# Patient Record
Sex: MALE | Race: WHITE | Employment: OTHER | URBAN - METROPOLITAN AREA
[De-identification: names, ages, dates, MRNs, and addresses within clinical notes are randomized per-mention and may not be internally consistent; named-entity substitution may affect disease eponyms.]

---

## 2017-01-03 PROBLEM — E78.5 DYSLIPIDEMIA: Status: ACTIVE | Noted: 2017-01-03

## 2017-01-03 PROBLEM — I10 ESSENTIAL HYPERTENSION: Status: ACTIVE | Noted: 2017-01-03

## 2017-06-08 ENCOUNTER — HOSPITAL ENCOUNTER (OUTPATIENT)
Dept: LAB | Age: 67
Discharge: HOME | End: 2017-06-08
Attending: ORTHOPAEDIC SURGERY
Payer: COMMERCIAL

## 2017-06-08 LAB
ALBUMIN SERPL BCP-MCNC: 3.6 G/DL (ref 3.2–4.6)
ALBUMIN/GLOB SERPL: 1 {RATIO} (ref 1.2–3.5)
ALP SERPL-CCNC: 59 U/L (ref 50–136)
ALT SERPL-CCNC: 24 U/L (ref 12–65)
ANION GAP BLD CALC-SCNC: 11 MMOL/L (ref 7–16)
AST SERPL W P-5'-P-CCNC: 17 U/L (ref 15–37)
BASOPHILS # BLD AUTO: 0 K/UL (ref 0–0.2)
BASOPHILS # BLD: 0 % (ref 0–2)
BILIRUB SERPL-MCNC: 0.3 MG/DL (ref 0.2–1.1)
BUN SERPL-MCNC: 18 MG/DL (ref 8–23)
CALCIUM SERPL-MCNC: 8.8 MG/DL (ref 8.3–10.4)
CHLORIDE SERPL-SCNC: 105 MMOL/L (ref 98–107)
CO2 SERPL-SCNC: 27 MMOL/L (ref 21–32)
CREAT SERPL-MCNC: 1.1 MG/DL (ref 0.8–1.5)
CRP SERPL-MCNC: <0.2 MG/DL (ref 0–0.9)
DIFFERENTIAL METHOD BLD: ABNORMAL
EOSINOPHIL # BLD: 0.2 K/UL (ref 0–0.8)
EOSINOPHIL NFR BLD: 2 % (ref 0.5–7.8)
ERYTHROCYTE [DISTWIDTH] IN BLOOD BY AUTOMATED COUNT: 14.4 % (ref 11.9–14.6)
ERYTHROCYTE [SEDIMENTATION RATE] IN BLOOD: 9 MM/HR (ref 0–20)
GLOBULIN SER CALC-MCNC: 3.5 G/DL (ref 2.3–3.5)
GLUCOSE SERPL-MCNC: 96 MG/DL (ref 65–100)
HCT VFR BLD AUTO: 41.4 % (ref 41.1–50.3)
HGB BLD-MCNC: 13.5 G/DL (ref 13.6–17.2)
IMM GRANULOCYTES # BLD: 0 K/UL (ref 0–0.5)
IMM GRANULOCYTES NFR BLD AUTO: 0.1 % (ref 0–5)
LYMPHOCYTES # BLD AUTO: 27 % (ref 13–44)
LYMPHOCYTES # BLD: 2.2 K/UL (ref 0.5–4.6)
MCH RBC QN AUTO: 28.2 PG (ref 26.1–32.9)
MCHC RBC AUTO-ENTMCNC: 32.6 G/DL (ref 31.4–35)
MCV RBC AUTO: 86.6 FL (ref 79.6–97.8)
MONOCYTES # BLD: 0.5 K/UL (ref 0.1–1.3)
MONOCYTES NFR BLD AUTO: 6 % (ref 4–12)
NEUTS SEG # BLD: 5.4 K/UL (ref 1.7–8.2)
NEUTS SEG NFR BLD AUTO: 65 % (ref 43–78)
PLATELET # BLD AUTO: 297 K/UL (ref 150–450)
PMV BLD AUTO: 10.5 FL (ref 10.8–14.1)
POTASSIUM SERPL-SCNC: 4.2 MMOL/L (ref 3.5–5.1)
PROT SERPL-MCNC: 7.1 G/DL (ref 6.3–8.2)
RBC # BLD AUTO: 4.78 M/UL (ref 4.23–5.67)
SODIUM SERPL-SCNC: 143 MMOL/L (ref 136–145)
URATE SERPL-MCNC: 4.6 MG/DL (ref 2.6–6)
WBC # BLD AUTO: 8.3 K/UL (ref 4.3–11.1)

## 2017-06-08 PROCEDURE — 80053 COMPREHEN METABOLIC PANEL: CPT | Performed by: ORTHOPAEDIC SURGERY

## 2017-06-08 PROCEDURE — 84550 ASSAY OF BLOOD/URIC ACID: CPT | Performed by: ORTHOPAEDIC SURGERY

## 2017-06-08 PROCEDURE — 86430 RHEUMATOID FACTOR TEST QUAL: CPT | Performed by: ORTHOPAEDIC SURGERY

## 2017-06-08 PROCEDURE — 81374 HLA I TYPING 1 ANTIGEN LR: CPT | Performed by: ORTHOPAEDIC SURGERY

## 2017-06-08 PROCEDURE — 85025 COMPLETE CBC W/AUTO DIFF WBC: CPT | Performed by: ORTHOPAEDIC SURGERY

## 2017-06-08 PROCEDURE — 86038 ANTINUCLEAR ANTIBODIES: CPT | Performed by: ORTHOPAEDIC SURGERY

## 2017-06-08 PROCEDURE — 86140 C-REACTIVE PROTEIN: CPT | Performed by: ORTHOPAEDIC SURGERY

## 2017-06-08 PROCEDURE — 85652 RBC SED RATE AUTOMATED: CPT | Performed by: ORTHOPAEDIC SURGERY

## 2017-06-08 PROCEDURE — 36415 COLL VENOUS BLD VENIPUNCTURE: CPT | Performed by: ORTHOPAEDIC SURGERY

## 2017-06-09 LAB
ANA SER QL: NEGATIVE
RHEUMATOID FACT SER QL LA: NEGATIVE

## 2017-06-13 LAB — HLA-B27 QL NAA+PROBE: NEGATIVE

## 2018-08-22 ENCOUNTER — HOSPITAL ENCOUNTER (OUTPATIENT)
Dept: LAB | Age: 68
Discharge: HOME | End: 2018-08-22

## 2018-08-22 PROCEDURE — 88305 TISSUE EXAM BY PATHOLOGIST: CPT

## 2018-11-01 ENCOUNTER — HOSPITAL ENCOUNTER (OUTPATIENT)
Dept: GENERAL RADIOLOGY | Age: 68
Discharge: HOME | End: 2018-11-01
Payer: COMMERCIAL

## 2018-11-01 DIAGNOSIS — M54.2 CERVICAL PAIN: ICD-10-CM

## 2018-11-01 PROCEDURE — 72050 X-RAY EXAM NECK SPINE 4/5VWS: CPT

## 2018-11-14 ENCOUNTER — HOSPITAL ENCOUNTER (OUTPATIENT)
Dept: GENERAL RADIOLOGY | Age: 68
Discharge: HOME | End: 2018-11-14
Payer: COMMERCIAL

## 2018-11-14 DIAGNOSIS — R52 PAIN: ICD-10-CM

## 2018-11-14 PROCEDURE — 73560 X-RAY EXAM OF KNEE 1 OR 2: CPT

## 2019-04-24 ENCOUNTER — HOSPITAL ENCOUNTER (OUTPATIENT)
Dept: GENERAL RADIOLOGY | Age: 69
Discharge: HOME | End: 2019-04-24
Payer: COMMERCIAL

## 2019-04-24 DIAGNOSIS — M54.50 LOW BACK PAIN: ICD-10-CM

## 2019-04-24 DIAGNOSIS — M54.6 THORACIC BACK PAIN: ICD-10-CM

## 2019-04-24 PROCEDURE — 72070 X-RAY EXAM THORAC SPINE 2VWS: CPT

## 2019-04-24 PROCEDURE — 72110 X-RAY EXAM L-2 SPINE 4/>VWS: CPT

## 2020-08-24 ENCOUNTER — HOSPITAL ENCOUNTER (OUTPATIENT)
Dept: PHYSICAL THERAPY | Age: 70
Discharge: HOME | End: 2020-08-24
Payer: COMMERCIAL

## 2020-08-24 PROCEDURE — 97162 PT EVAL MOD COMPLEX 30 MIN: CPT

## 2020-08-24 PROCEDURE — 97112 NEUROMUSCULAR REEDUCATION: CPT

## 2020-08-24 NOTE — THERAPY EVALUATION
Maurizio Agustin. : 1950  [facility]  Secondary: [address] [facility],Third Floor at [facility]  [address] [facility], [address], [address]  [phone]   [phone]        [address] Assessment 2020   ICD-10: Treatment Diagnosis: Difficulty in walking, not elsewhere classified (R26.2)  Precautions/Allergies:   Aspirin and Sulfa (sulfonamide antibiotics)   TREATMENT PLAN:  Effective Dates: 2020 TO 2020 (90 days). Frequency/Duration: 1-2 times a week for 90 Day(s) MEDICAL/REFERRING DIAGNOSIS:  balance   DATE OF ONSET: 2020  REFERRING PHYSICIAN: Benny Crowell MD MD Orders: Evaluate and treat   Return MD Appointment: unknown      INITIAL ASSESSMENT:  Mr. Dali Walker presents with imbalance, dizziness, and difficulty walking. Patient is at higher fall risk based on history of multiple falls and score received on Dynamic Gait Index. Patient would benefit from skilled physical therapy to address problems and goals. Thank you for this referral.      PROBLEM LIST (Impacting functional limitations):  1. Decreased Ambulation Ability/Technique  2. Decreased Balance  3. Decreased [place] with Home Exercise Program  4. Increased dizziness INTERVENTIONS PLANNED: (Treatment may consist of any combination of the following)  1. Balance Exercise  2. Gait Training  3. Home Exercise Program (HEP)  4. Habituation exercises     GOALS: (Goals have been discussed and agreed upon with patient.)  Short-Term Functional Goals: Time Frame: 30 days   1. Patient will be independent with home exercise program to improve balance and decrease risk of falls with daily activities. Discharge Goals: Time Frame: 90 days   1. Patient will score greater than or equal to 23/24 on Dynamic Gait Index indicating improved balance and decreased fall risk with daily activities.    2. Patient will score above average two out of three times on condition 6 of SMART Equitest Sensory Organization test indicating improved balance. 3. Patient will report improved stability and decreased falls with daily activities. OUTCOME MEASURE:   Tool Used: Dynamic Gait Index  Score:  Initial: 19/24 Most Recent: X/24 (Date: -- )   Interpretation of Score: Each section is scored on a 0-3 scale, 0 representing the patients inability to perform the task and 3 representing independence. The scores of each section are added together for a total score of 24. Any score below 19 indicates increased risk for falls. MEDICAL NECESSITY:   · Patient is expected to demonstrate progress in balance to improve safety during activities of daily living. REASON FOR SERVICES/OTHER COMMENTS:  · Patient continues to require skilled intervention due to higher fall risk with daily activities. Total Duration:  PT Patient Time In/Time Out  Time In: 0845  Time Out: 0930    Rehabilitation Potential For Stated Goals: Excellent  Regarding Flora Miller Jr.'s therapy, I certify that the treatment plan above will be carried out by a therapist or under their direction. Thank you for this referral,  Faith Adkins, PT     Referring Physician Signature: Clare Nicole MD _______________________________ Date _____________     PAIN/SUBJECTIVE:   Initial: Pain Intensity 1: 0  Post Session:  0/10   HISTORY:   History of Injury/Illness (Reason for Referral):  Patient complaints of dizziness, imbalance, and difficulty walking. Patient reports symptoms have gotten worse since March 2020. Patient has had ten falls over the past couple of months. Patient tends to lose his balance backwards. Patient reports dizziness as 3/10 and pain level as 0/10. Patient describes dizziness as lightheadedness with quick movements. Patient uses no assistive device during ambulation.     Past Medical History/Comorbidities:   Mr. India Parada  has a past medical history of Cancer ([address] Hypercholesteremia, Hypertension, Inguinal hernia (9/17/14), Personal history of prostate cancer, and Psychiatric disorder. Mr. Jose Ramon Art  has a past surgical history that includes pr appendectomy; biopsy prostate; hx hernia repair (Left); and hx prostatectomy. Social History/Living Environment:     Lives with spouse in split level home. Prior Level of Function/Work/Activity:  Independent. Retired. Dominant Side:         RIGHT  Personal Factors:          Sex:  male        Age:  72 y.o. Ambulatory/Rehab Services H2 Model Falls Risk Assessment   Risk Factors:       (1)  Dizziness/Vertigo       (1)  Gender [Male]       (5)  History of Recent Falls [w/in 3 months] Ability to Rise from Chair:       (0)  Ability to rise in a single movement   Falls Prevention Plan:       Exercise/Equipment Adaptation (specify):  Patient will be supevised in the clinic at all times due to higher fall risk   Total: (5 or greater = High Risk): 7   ©2010 [facility]. [facility] Patent #[id number]. Federal Law prohibits the replication, distribution or use without written permission from [facility] [employer]   Current Medications:       Current Outpatient Medications:     omeprazole (PRILOSEC) 40 mg capsule, , Disp: , Rfl:     predniSONE (DELTASONE) 5 mg tablet, , Disp: , Rfl:     methotrexate (RHEUMATREX) 2.5 mg tablet, , Disp: , Rfl:     lamoTRIgine (LAMICTAL) 200 mg tablet, Take 400 mg by mouth nightly., Disp: , Rfl:     cholecalciferol (VITAMIN D3) 1,000 unit tablet, Take  by mouth daily. , Disp: , Rfl:     acetaminophen (TYLENOL EXTRA STRENGTH) 500 mg tablet, Take  by mouth every six (6) hours as needed for Pain., Disp: , Rfl:     Bifidobacterium Infantis (ALIGN) 4 mg cap, Take 4 mg by mouth daily. , Disp: , Rfl:     diazepam (VALIUM) 5 mg tablet, Take 5 mg by mouth every six (6) hours as needed for Anxiety.  Take 1 po tid prn, Disp: , Rfl:     QUEtiapine SR (SEROQUEL XR) 50 mg sr tablet, Take 50 mg by mouth nightly. Indications: BIPOLAR DISORDER, also insomnia, Disp: , Rfl:     PSEUDOEPHEDRINE HCL, BULK,, Take 30 mg by mouth daily as needed. , Disp: , Rfl:     amLODIPine (NORVASC) 5 mg tablet, Take 5 mg by mouth nightly. Indications: HYPERTENSION, Disp: , Rfl:     lisinopril (PRINIVIL, ZESTRIL) 20 mg tablet, Take  by mouth daily. Indications: HYPERTENSION, pm, Disp: , Rfl:     atorvastatin (LIPITOR) 20 mg tablet, Take 20 mg by mouth nightly. Indications: HYPERCHOLESTEROLEMIA, Disp: , Rfl:     cetirizine (ZYRTEC) 10 mg tablet, Take 10 mg by mouth nightly. Indications: ALLERGIC RHINITIS, Disp: , Rfl:     zolpidem (AMBIEN) 10 mg tablet, Take  by mouth nightly as needed for Sleep., Disp: , Rfl:    Date Last Reviewed:  8/24/2020   Number of Personal Factors/Comorbidities that affect the Plan of Care: 1-2: MODERATE COMPLEXITY   EXAMINATION:   Functional Mobility:         Gait/Ambulation:  Patient ambulates with a normal gait pattern except with head/body turns. Strength:          Bilateral lower extremity strength 5/5. Sensation:         Within normal limits. Postural Control & Balance:  · Feliz Balance Scale:  Not tested. · Dynamic Gait Index:  19/24.   (A score less than or equal to19/24 is abnormal and predictive of falls)     · [employer] Sensory Organization Test:  Objective findings indicate Normal use of somatosensory, Decreased use of visual, & Normal use of vestibular inputs to balance. Center of gravity is normal.  See scanned report. Patient unable to maintain his balance two out of three times on condition 6 of this test.  Patient scored 10% below norm composite score. Body Structures Involved:  1. Nerves  2. Eyes and Ears  3. Muscles Body Functions Affected:  1. Neuromusculoskeletal Activities and Participation Affected:  1. General Tasks and Demands  2. Mobility  3. Interpersonal Interactions and Relationships  4.  Community, Social and [place]   Number of elements (examined above) that affect the Plan of Care: 4+: HIGH COMPLEXITY   CLINICAL PRESENTATION:   Presentation: Evolving clinical presentation with changing clinical characteristics: MODERATE COMPLEXITY   CLINICAL DECISION MAKING:   Use of outcome tool(s) and clinical judgement create a POC that gives a: Questionable prediction of patient's progress: MODERATE COMPLEXITY

## 2020-08-24 NOTE — PROGRESS NOTES
Jennifer Rodriguez. : 1950  [facility]  [place]  at [place]  [address] [facility], [address] [place] [address] 83,[address], [address]  [phone]   [phone]     OUTPATIENT PHYSICAL THERAPY: Daily Treatment Note 2020  Visit Count:  1    ICD-10: Treatment Diagnosis: Difficulty in walking, not elsewhere classified (R26.2)  Precautions/Allergies:   Aspirin and Sulfa (sulfonamide antibiotics)   TREATMENT PLAN:  Effective Dates: 2020 TO 2020 (90 days). Frequency/Duration: 1-2 times a week for 90 Day(s)    Pre-treatment Symptoms/Complaints:  Imbalance, dizziness, and difficulty walking  Pain: Initial: Pain Intensity 1: 0  Post Session:  0/10   Medications Last Reviewed:  2020  Updated Objective Findings:  See evaluation note from today  TREATMENT:     NEUROMUSCULAR RE-EDUCATION: (10 minutes):  Exercise/activities per grid below to improve balance and coordination. Required minimal verbal cues to promote dynamic balance in standing. Date:  2020 Date:   Date:     Activity/Exercise Parameters Parameters Parameters   Walking with horizontal head turns 4 laps     Walking with vertical head turns 4 laps     Standing eyes closed Eyes closed                                  [facility] [place] Portal  Treatment/Session Summary:    · Response to Treatment:  tolerated without complaints. · Communication/Consultation:  None today  · Equipment provided today:  Home exercise program  · Recommendations/Intent for next treatment session: Next visit will focus on balance exercises, habituation exercises, and gait.     Total Treatment Billable Duration:  10 minutes + evaluation  PT Patient Time In/Time Out  Time In: 45  Time Out: Hector Means 66., PT    Future Appointments   Date Time Provider Fallon Beatty   2020 10:15 AM Ulysses Duran PT [facility] RAYRAY   9/3/2020  9:30 AM Husam Bahena PT [facility] SFE 9/8/2020 10:15 AM Vissage Villatoro Feil, PT [facility] SFE   9/10/2020 10:15 AM Vissage, Villatoro Feil, PT [facility] SFE   9/14/2020  9:30 AM Vissage, Villatoro Feil, PT [facility] S[facility]   9/17/2020 10:15 AM Vissage, Villatoro Feil, PT [facility] SFE   10/1/2020  4:15 PM [id number] BLOOD DRAW [id number] PGU   10/8/2020  4:15 PM Stewart Caballero,  [id number] PGU

## 2020-08-31 ENCOUNTER — HOSPITAL ENCOUNTER (OUTPATIENT)
Dept: PHYSICAL THERAPY | Age: 70
Discharge: HOME | End: 2020-08-31
Payer: COMMERCIAL

## 2020-08-31 PROCEDURE — 97112 NEUROMUSCULAR REEDUCATION: CPT

## 2020-08-31 NOTE — PROGRESS NOTES
Keith Handler. : 1950  [facility]  [place] [address] [place]  at [place] [id number] [facility], [address] [place] [address],[address], [facility] [address].  [phone]   [phone]     OUTPATIENT PHYSICAL THERAPY: Daily Treatment Note 2020  Visit Count:  2    ICD-10: Treatment Diagnosis: Difficulty in walking, not elsewhere classified (R26.2)  Precautions/Allergies:   Aspirin and Sulfa (sulfonamide antibiotics)   TREATMENT PLAN:  Effective Dates: 2020 TO 2020 (90 days). Frequency/Duration: 1-2 times a week for 90 Day(s)    Pre-treatment Symptoms/Complaints:  Imbalance, dizziness, and difficulty walking; no falls today  3/10 dizziness  Pain: Initial:    Post Session:  0/10   Medications Last Reviewed:  2020  Updated Objective Findings:  None Today  TREATMENT:     NEUROMUSCULAR RE-EDUCATION: (10 minutes):  Exercise/activities per grid below to improve balance and coordination. Required minimal verbal cues to promote dynamic balance in standing.        Date:  2020 Date:   Date:     Activity/Exercise Parameters Parameters Parameters   Walking with horizontal head turns 4 laps     Walking with vertical head turns 4 laps     Standing eyes closed Eyes closed                                  Balance/Vestibular Treatment:   Activity   Date  20 Date Date Date Date Date   Activity/Exercise   Sets/reps/equipment Sets/reps/  equipment Sets/reps/  equipment Sets/reps/  equipment Sets/reps/  equipment Sets/reps/  equipment   Walking with head turns     4 laps in hallway        Walking with head up & down     4 laps in hallway        Walking with diagonal head movements         Step overs     Over 1/2 foam roll x 20 reps fwd and laterallyy        Step taps     6 inch step x 30 reps fwd and cross        Marching   4 laps in hallway        Sidestepping   4 laps in hallway        Crossovers           [place]           Walking  backwards 4 laps in hallway        Tandem walking           Weaving in/out of cones             Picking up cones             Sports cord             [employer]           Kick ball           Figure 8s            Circles right/left           Walking with 360 degree turns           Spirals           Weight shifting:    Left & Right     Blue foams and rockerboard eyes open        Weight shifting: Forward & Backward      Blue foams and rockerboard eyes open        Static Standing Balance     Blue foams eyes open with head turns and up/down        Standing with feet apart             Standing with feet together             Standing with feet semitandem   Blue foams eyes open with head turns and up/down; blue foams eyes closed        Standing with feet tandem           Single leg stance           X1/X2 Viewing exercises             Hallpike-[place] testing for BPPV (Benign Paroxysmal Positional Vertigo)             Tejada-Daroff exercises           Canalith Repositioning treatment/Epley Maneuver  for BPPV (Benign Paroxysmal Positional Vertigo)           Smart Equitest Training: See scanned report. [employer]  Treatment/Session Summary:    · Response to Treatment: Patient tolerated session well. Patient did well with all exercises with no increase in dizziness. Continue to progress as tolerated. · Communication/Consultation:  None today  · Equipment provided today:  None today  · Recommendations/Intent for next treatment session: Next visit will focus on balance exercises, habituation exercises, and gait.     Total Treatment Billable Duration:  45 minutes   PT Patient Time In/Time Out  Time In: 1015  Time Out: 3941 Chapito Tompkins, PT    Future Appointments   Date Time Provider Fallon Beatty   9/3/2020  9:30 AM Marcia Arauz PT [facility]   9/8/2020 10:15 AM Lidia Bahena, PT [facility]   9/10/2020 10:15 AM Lidia Bahena, PT [facility]   9/14/2020  9:30 AM Lidia Bahena, PT [facility] 9/17/2020 10:15 AM Ebony Bahena, PT [facility] SFE   10/1/2020  4:15 PM [id number] BLOOD DRAW [id number] PGU   10/8/2020  4:15 PM Tj Caballero, DO [id number] PGU

## 2020-09-03 ENCOUNTER — HOSPITAL ENCOUNTER (OUTPATIENT)
Dept: PHYSICAL THERAPY | Age: 70
Discharge: HOME | End: 2020-09-03
Payer: COMMERCIAL

## 2020-09-03 PROCEDURE — 97112 NEUROMUSCULAR REEDUCATION: CPT

## 2020-09-03 NOTE — PROGRESS NOTES
Torsten Akers. : 1950  Primary: [facility] Hmo/c*  [place] [address] [facility] at [address], [address] [place] [address],[address], [address] [facility]  [phone]   [phone]     OUTPATIENT PHYSICAL THERAPY: Daily Treatment Note 9/3/2020  Visit Count:  3    ICD-10: Treatment Diagnosis: Difficulty in walking, not elsewhere classified (R26.2)  Precautions/Allergies:   Aspirin and Sulfa (sulfonamide antibiotics)   TREATMENT PLAN:  Effective Dates: 2020 TO 2020 (90 days). Frequency/Duration: 1-2 times a week for 90 Day(s)    Pre-treatment Symptoms/Complaints:  \"I had one fall yesterday. I bent down and my feet slipped out from underneath me\". 4/10 dizziness. Pain: Initial: Pain Intensity 1: 0  Post Session:  0/10   Medications Last Reviewed:  9/3/2020  Updated Objective Findings:  None Today  TREATMENT:     NEUROMUSCULAR RE-EDUCATION: (40 minutes):  Exercise/activities per grid below to improve balance and coordination. Required minimal verbal cues to promote dynamic balance in standing.       Balance/Vestibular Treatment:   Activity   Date  20 Date  9/3/2020 Date Date Date Date   Activity/Exercise   Sets/reps/equipment Sets/reps/  equipment Sets/reps/  equipment Sets/reps/  equipment Sets/reps/  equipment Sets/reps/  equipment   Walking with head turns     4 laps in hallway 4 laps in hallway       Walking with head up & down     4 laps in hallway 4 laps in hallway        Walking with diagonal head movements         Step overs     Over 1/2 foam roll x 20 reps fwd and laterally Over 1/2 foam roll x 20 reps fwd and laterally       Step taps     6 inch step x 30 reps fwd and cross 6 inch step x 30 reps fwd and cross       Marching   4 laps in hallway 4 laps in hallway       Sidestepping   4 laps in hallway 4 laps in hallway       Crossovers           [place]           Walking  backwards     4 laps in hallway 4 laps       Tandem walking    Semi tandem  4 laps in hallway       Weaving in/out of cones      4 laps in hallway       Picking up cones             Sports cord             [employer]           Kick ball           Figure 8s            Circles right/left           Walking with 360 degree turns           Spirals           Weight shifting:    Left & Right     Blue foams and rockerboard eyes open Tiltboard       Weight shifting: Forward & Backward      Blue foams and rockerboard eyes open Tiltboard       Static Standing Balance     Blue foams eyes open with head turns and up/down Tiltboard/sanddune eyes open with head turns; eyes closed       Standing with feet apart             Standing with feet together             Standing with feet semitandem   Blue foams eyes open with head turns and up/down; blue foams eyes closed        Standing with feet tandem           Single leg stance           Sit to stand from chair with out upper extremity support      10 reps       Hallpike-Daiana testing for BPPV (Benign Paroxysmal Positional Vertigo)             Tejada-Daroff exercises           Canalith Repositioning treatment/Epley Maneuver  for BPPV (Benign Paroxysmal Positional Vertigo)           Smart Equitest Training: See scanned report. [employer] Portal  Treatment/Session Summary:    · Response to Treatment: Patient tends to lose his balance posterior with step overs and standing on uneven surfaces. Patient needs verbal cues to shift weight forward. · Communication/Consultation:  None today  · Equipment provided today:  None today  · Recommendations/Intent for next treatment session: Next visit will focus on balance exercises, habituation exercises, and gait.     Total Treatment Billable Duration:  40 minutes   PT Patient Time In/Time Out  Time In: 7579  Time Out: Dorothy Dhaliwal PT    Future Appointments   Date Time Provider Fallon Beatty   9/8/2020 10:15 AM Dawit Talavera PT [facility]   9/10/2020 10:15 AM Josef Yonas Sutherland, PT [facility] SFE   9/14/2020  9:30 AM Yonas Bahena, PT [facility] SFE   9/17/2020 10:15 AM Yonas Bahena, PT [facility] SFE   10/1/2020  4:15 PM [id number] BLOOD DRAW [id number] PGU   10/8/2020  4:15 PM Annalise Caballero,  [id number] PGU

## 2020-09-08 ENCOUNTER — HOSPITAL ENCOUNTER (OUTPATIENT)
Dept: PHYSICAL THERAPY | Age: 70
Discharge: HOME | End: 2020-09-08
Payer: COMMERCIAL

## 2020-09-08 PROCEDURE — 97112 NEUROMUSCULAR REEDUCATION: CPT

## 2020-09-08 NOTE — PROGRESS NOTES
Colleen Christensen. : 1950  Primary: [address] [facility] Hmo/c*  Secondary:  [address] at [address], [address] [place] [address],[address], [place] [address].  [phone]   [phone]     OUTPATIENT PHYSICAL THERAPY: Daily Treatment Note 2020  Visit Count:  4    ICD-10: Treatment Diagnosis: Difficulty in walking, not elsewhere classified (R26.2)  Precautions/Allergies:   Aspirin and Sulfa (sulfonamide antibiotics)   TREATMENT PLAN:  Effective Dates: 2020 TO 2020 (90 days). Frequency/Duration: 1-2 times a week for 90 Day(s)    Pre-treatment Symptoms/Complaints:  \"A little lightheaded today. I fell twice hiking yesterday\". 3/10 dizziness. Pain: Initial: Pain Intensity 1: 0  Post Session:  0/10   Medications Last Reviewed:  2020  Updated Objective Findings:  None Today  TREATMENT:     NEUROMUSCULAR RE-EDUCATION: (45 minutes):  Exercise/activities per grid below to improve balance and coordination. Required minimal verbal cues to promote dynamic balance in standing.       Balance/Vestibular Treatment:   Activity   Date  20 Date  9/3/2020 Date  20 Date Date Date   Activity/Exercise   Sets/reps/equipment Sets/reps/  equipment Sets/reps/  equipment Sets/reps/  equipment Sets/reps/  equipment Sets/reps/  equipment   Walking with head turns     4 laps in hallway 4 laps in hallway 4 laps in hallway      Walking with head up & down     4 laps in hallway 4 laps in hallway  4 laps in hallway      Walking with diagonal head movements   4 laps in hallway      Step overs     Over 1/2 foam roll x 20 reps fwd and laterally Over 1/2 foam roll x 20 reps fwd and laterally Over 1/2 foam roll x 20 reps fwd and laterally      Step taps     6 inch step x 30 reps fwd and cross 6 inch step x 30 reps fwd and cross 6 inch step x 30 reps fwd and cross      Marching   4 laps in hallway 4 laps in hallway 4 laps in hallway      Sidestepping   4 laps in hallway 4 laps in hallway 4 laps in hallway      Crossovers     4 laps in hallway      [place]           Walking  backwards     4 laps in hallway 4 laps 4 laps in hallway      Tandem walking    Semi tandem  4 laps in hallway Semi tandem  4 laps in hallway      Weaving in/out of cones      4 laps in hallway 4 laps in hallway      Picking up cones             Sports cord             [employer]           Kick ball           Figure 8s            Circles right/left           Walking with 360 degree turns           Spirals           Weight shifting:    Left & Right     Blue foams and rockerboard eyes open Tiltboard Tiltboard      Weight shifting: Forward & Backward      Blue foams and rockerboard eyes open Tiltboard Tiltboard      Static Standing Balance     Blue foams eyes open with head turns and up/down Tiltboard/sanddune eyes open with head turns; eyes closed Tiltboard/sanddune eyes open with head turns; eyes closed      Standing with feet apart             Standing with feet together             Standing with feet semitandem   Blue foams eyes open with head turns and up/down; blue foams eyes closed  Blue foam eyes open and eyes closed      Standing with feet tandem           Single leg stance           Sit to stand from chair with out upper extremity support      10 reps       Hallpike-Daiana testing for BPPV (Benign Paroxysmal Positional Vertigo)             Tejada-Daroff exercises           Canalith Repositioning treatment/Epley Maneuver  for BPPV (Benign Paroxysmal Positional Vertigo)           Smart Equitest Training: See scanned report. [employer] Portal  Treatment/Session Summary:    · Response to Treatment: Patient has difficulty coordinating step overs and step taps. Patient needs verbal cues to slow down his speed and think about his foot placement with exercises.    · Communication/Consultation:  None today  · Equipment provided today:  None today  · Recommendations/Intent for next treatment session: Next visit will focus on balance exercises, habituation exercises, and gait.     Total Treatment Billable Duration:  45 minutes   PT Patient Time In/Time Out  Time In: 1015  Time Out: Tobin 51, PT    Future Appointments   Date Time Provider Fallon Beatty   9/10/2020 10:15 AM Marvell Spatz, PT [facility]   9/14/2020  9:30 AM Ebony Bahena, PT [facility] E   9/17/2020 10:15 AM Ebony Bahena, PT S[facility]OR[facility] E   10/1/2020  4:15 PM [id number] BLOOD DRAW [id number] PGU   10/8/2020  4:15 PM Tj Caballero,  [id number] PGU

## 2020-09-10 ENCOUNTER — HOSPITAL ENCOUNTER (OUTPATIENT)
Dept: PHYSICAL THERAPY | Age: 70
Discharge: HOME | End: 2020-09-10
Payer: COMMERCIAL

## 2020-09-10 PROCEDURE — 97112 NEUROMUSCULAR REEDUCATION: CPT

## 2020-09-10 NOTE — PROGRESS NOTES
Arturo Nova. : 1950  Primary: [address] [facility] Hmo/c*  Secondary:  [address] at [address] [address] [facility], [address] [place] [address],[address], [facility] 91.  [phone]   [phone]     OUTPATIENT PHYSICAL THERAPY: Daily Treatment Note 9/10/2020  Visit Count:  5    ICD-10: Treatment Diagnosis: Difficulty in walking, not elsewhere classified (R26.2)  Precautions/Allergies:   Aspirin and Sulfa (sulfonamide antibiotics)   TREATMENT PLAN:  Effective Dates: 2020 TO 2020 (90 days). Frequency/Duration: 1-2 times a week for 90 Day(s)    Pre-treatment Symptoms/Complaints: \"One fall. I bent down to put a tape in a tape player and lost my balance\". 0/10 dizziness. Pain: Initial: Pain Intensity 1: 0  Post Session:  0/10   Medications Last Reviewed:  9/10/2020  Updated Objective Findings:  None Today  TREATMENT:     NEUROMUSCULAR RE-EDUCATION: (45 minutes):  Exercise/activities per grid below to improve balance and coordination. Required minimal verbal cues to promote dynamic balance in standing.       Balance/Vestibular Treatment:   Activity   Date  20 Date  9/3/2020 Date  20 Date  9/10/20 Date Date   Activity/Exercise   Sets/reps/equipment Sets/reps/  equipment Sets/reps/  equipment Sets/reps/  equipment Sets/reps/  equipment Sets/reps/  equipment   Walking with head turns     4 laps in hallway 4 laps in hallway 4 laps in hallway 4 laps in hallway     Walking with head up & down     4 laps in hallway 4 laps in hallway  4 laps in hallway 4 laps in hallway     Walking with diagonal head movements   4 laps in hallway 4 laps in hallway     Step overs     Over 1/2 foam roll x 20 reps fwd and laterally Over 1/2 foam roll x 20 reps fwd and laterally Over 1/2 foam roll x 20 reps fwd and laterally Over 1/2 foam roll x 20 reps fwd and laterally     Step taps     6 inch step x 30 reps fwd and cross 6 inch step x 30 reps fwd and cross 6 inch step x 30 reps fwd and cross 6 inch step x 30 reps fwd and cross     Marching   4 laps in hallway 4 laps in hallway 4 laps in hallway 4 laps in hallway     Sidestepping   4 laps in hallway 4 laps in hallway 4 laps in hallway 4 laps in hallway     Crossovers     4 laps in hallway 2 laps in hallway      [place]           Walking  backwards     4 laps in hallway 4 laps 4 laps in hallway 4 laps in hallway     Tandem walking    Semi tandem  4 laps in hallway Semi tandem  4 laps in hallway 4 laps in hallway     Weaving in/out of cones      4 laps in hallway 4 laps in hallway 4 laps in hallway     Picking up cones        8 cones     Sports cord             [employer]           Kick ball           Figure 8s            Circles right/left           Walking with 360 degree turns           Spirals           Weight shifting:    Left & Right     Blue foams and rockerboard eyes open Tiltboard Tiltboard Tiltboard     Weight shifting: Forward & Backward      Blue foams and rockerboard eyes open Tiltboard Tiltboard Tiltboard     Static Standing Balance     Blue foams eyes open with head turns and up/down Tiltboard/sanddune eyes open with head turns; eyes closed Tiltboard/sanddune eyes open with head turns; eyes closed Tiltboard/sanddune eyes open with head turns; eyes closed     Standing with feet apart             Standing with feet together             Standing with feet semitandem   Blue foams eyes open with head turns and up/down; blue foams eyes closed  Blue foam eyes open and eyes closed Blue foam eyes open and eyes closed     Standing with feet tandem           Single leg stance           Sit to stand from chair with out upper extremity support      10 reps       Hallpike-Daiana testing for BPPV (Benign Paroxysmal Positional Vertigo)             Terrell-Altaf exercises           Canalith Repositioning treatment/Epley Maneuver  for BPPV (Benign Paroxysmal Positional Vertigo)           Smart [employer] Training: See scanned report.                [employer] Portal  Treatment/Session Summary:    · Response to Treatment: Patient more balanced with exercises today. Patient needs verbal cues to keep center of gravity forward-patient tends to lose his balance backwards. · Communication/Consultation:  None today  · Equipment provided today:  None today  · Recommendations/Intent for next treatment session: Next visit will focus on balance exercises, habituation exercises, and gait.     Total Treatment Billable Duration:  45 minutes   PT Patient Time In/Time Out  Time In: 1015  Time Out: Tobin 51, PT    Future Appointments   Date Time Provider Fallon Beatty   9/14/2020  9:30 AM Marcia Arauz, PT [facility] SFE   9/17/2020 10:[facility]   10/1/2020  4:15 PM [id number] BLOOD DRAW [id number] PGU   10/8/2020  4:15 PM Hong Caballero, DO [id number] PGU

## 2020-09-14 ENCOUNTER — HOSPITAL ENCOUNTER (OUTPATIENT)
Dept: PHYSICAL THERAPY | Age: 70
Discharge: HOME | End: 2020-09-14
Payer: COMMERCIAL

## 2020-09-14 PROCEDURE — 97112 NEUROMUSCULAR REEDUCATION: CPT

## 2020-09-14 NOTE — PROGRESS NOTES
Marah Mahan. : 1950  Primary: [address] [facility] Hmo/c*  Secondary:  [address] at [address] [id number] [facility], [address] [place] [address],[address], [facility]. 91.  [phone]   [phone]     OUTPATIENT PHYSICAL THERAPY: Daily Treatment Note 2020  Visit Count:  6    ICD-10: Treatment Diagnosis: Difficulty in walking, not elsewhere classified (R26.2)  Precautions/Allergies:   Aspirin and Sulfa (sulfonamide antibiotics)   TREATMENT PLAN:  Effective Dates: 2020 TO 2020 (90 days). Frequency/Duration: 1-2 times a week for 90 Day(s)    Pre-treatment Symptoms/Complaints:  Patient reports he is not feeling good today. \"I have noticed I am having issues with word finding and memory\". Pain: Initial: Pain Intensity 1: 0  Post Session:  0/10   Medications Last Reviewed:  2020  Updated Objective Findings:  None Today  TREATMENT:     NEUROMUSCULAR RE-EDUCATION: (45 minutes):  Exercise/activities per grid below to improve balance and coordination. Required minimal verbal cues to promote dynamic balance in standing.       Balance/Vestibular Treatment:   Activity   Date  20 Date  9/3/2020 Date  20 Date  9/10/20 Date  20 Date   Activity/Exercise   Sets/reps/equipment Sets/reps/  equipment Sets/reps/  equipment Sets/reps/  equipment Sets/reps/  equipment Sets/reps/  equipment   Walking with head turns     4 laps in hallway 4 laps in hallway 4 laps in hallway 4 laps in hallway 4 laps    Walking with head up & down     4 laps in hallway 4 laps in hallway  4 laps in hallway 4 laps in hallway 4 laps    Walking with diagonal head movements   4 laps in hallway 4 laps in hallway 4 laps    Step overs     Over 1/2 foam roll x 20 reps fwd and laterally Over 1/2 foam roll x 20 reps fwd and laterally Over 1/2 foam roll x 20 reps fwd and laterally Over 1/2 foam roll x 20 reps fwd and laterally Over 1/2 foam roll x 20 reps fwd and laterally     Step taps     6 inch step x 30 reps fwd and cross 6 inch step x 30 reps fwd and cross 6 inch step x 30 reps fwd and cross 6 inch step x 30 reps fwd and cross 6 inch step x 30 reps fwd and cross    Marching   4 laps in hallway 4 laps in hallway 4 laps in hallway 4 laps in hallway 4 laps in hallway    Sidestepping   4 laps in hallway 4 laps in hallway 4 laps in hallway 4 laps in hallway 4 laps in hallway    Crossovers     4 laps in hallway 2 laps in hallway  4 laps laps in hallway    [place]           Walking  backwards     4 laps in hallway 4 laps 4 laps in hallway 4 laps in hallway 4 laps in hallway    Tandem walking    Semi tandem  4 laps in hallway Semi tandem  4 laps in hallway 4 laps in hallway 4 laps in hallway    Weaving in/out of cones      4 laps in hallway 4 laps in hallway 4 laps in hallway 4 laps in hallway    Picking up cones        8 cones 8 cones    Sports cord             [employer]           Kick ball           Figure 8s            Circles right/left           Walking with 360 degree turns           Spirals           Weight shifting:    Left & Right     Blue foams and rockerboard eyes open Tiltboard Tiltboard Tiltboard     Weight shifting:   Forward & Backward      Blue foams and rockerboard eyes open Tiltboard Tiltboard Tiltboard Tiltboard    Static Standing Balance     Blue foams eyes open with head turns and up/down Tiltboard/sanddune eyes open with head turns; eyes closed Tiltboard/sanddune eyes open with head turns; eyes closed Tiltboard/sanddune eyes open with head turns; eyes closed Tiltboard/eyes open with head turns; eyes closed    Standing with feet apart             Standing with feet together             Standing with feet semitandem   Blue foams eyes open with head turns and up/down; blue foams eyes closed  Blue foam eyes open and eyes closed Blue foam eyes open and eyes closed     Standing with feet tandem           Single leg stance           Sit to stand from chair with out upper extremity support      10 reps Hallpike-Daiana testing for BPPV (Benign Paroxysmal Positional Vertigo)             Tejada-Daroff exercises           Canalith Repositioning treatment/Epley Maneuver  for BPPV (Benign Paroxysmal Positional Vertigo)           Smart [employer] Training: See scanned report. [employer] Portal  Treatment/Session Summary:    · Response to Treatment: Patient continues to have difficulty with coordination, left lower extremity harder than right lower extremity. I think patient would benefit from seeing a neurologist due to changes in memory. · Communication/Consultation:  None today  · Equipment provided today:  None today  · Recommendations/Intent for next treatment session: Next visit will focus on balance exercises, habituation exercises, and gait.     Total Treatment Billable Duration:  45 minutes   PT Patient Time In/Time Out  Time In: 930  Time Out: Dorothy Dhaliwal, PT    Future Appointments   Date Time Provider Fallon Beatty   2020 10:15 AM Ursula Diego, RAMOS [facility] SFE   10/1/2020  4:15 PM [id number] BLOOD DRAW [id number] PGU   10/8/2020  4:15 PM Lawrence Caballero DO [id number] PGU

## 2020-09-17 ENCOUNTER — HOSPITAL ENCOUNTER (OUTPATIENT)
Dept: PHYSICAL THERAPY | Age: 70
Discharge: HOME | End: 2020-09-17
Payer: COMMERCIAL

## 2020-09-17 PROCEDURE — 97112 NEUROMUSCULAR REEDUCATION: CPT

## 2020-09-17 NOTE — PROGRESS NOTES
Jennifer Rodriguez. : 1950  Primary: [address] Hmo/c*  Secondary:  [address]  at [facility]  [address], [address] [facility] 83,[address], [address] [facility], [place]  [phone]   [phone]     Outpatient PHYSICAL THERAPY: PHYSICIAN COMMUNICATION    REFERRING PHYSICIAN: Omari Ivey MD  Return Physician Appointment: 2020  MEDICAL/REFERRING DIAGNOSIS:  · balance  ATTENDANCE: Jennifer Mack has attended 7 out of 7 visits, with 0 cancellation(s) and 1 no shows. ASSESSMENT:  DATE: 2020    PROGRESS: Jennifer Rodriguez. continues to have issues with imbalance, difficulty walking,  and decreased coordination. Patient has had several falls over the past couple of weeks. Patient tends to take center of gravity posterior. Patient has difficulty with toe taps and step overs due to decreased coordination. Patient also reports word finding difficulty and memory issues. I have instructed patient to slow down his movements to improve safety and to  his feet with turning.       RECOMMENDATIONS: Patient could benefit from consult with neurologist.     Thank you for this referral,  Jose Alejandro Tam, PT

## 2020-09-17 NOTE — PROGRESS NOTES
Sintia Rodriguez. : 1950  Primary: [address] [facility] [address] Hmo/c*  Secondary:  [address]  at [address] [address] [facility], [address] [place] [address],[address], [address] [facility]  [phone]   [phone]     OUTPATIENT PHYSICAL THERAPY: Daily Treatment Note 2020  Visit Count:  7    ICD-10: Treatment Diagnosis: Difficulty in walking, not elsewhere classified (R26.2)  Precautions/Allergies:   Aspirin and Sulfa (sulfonamide antibiotics)   TREATMENT PLAN:  Effective Dates: 2020 TO 2020 (90 days). Frequency/Duration: 1-2 times a week for 90 Day(s)    Pre-treatment Symptoms/Complaints:  \"Sorry we are running a little late. No falls. I had my best day yesterday, but my worst day today. I am really off balance today\". Pain: Initial: Pain Intensity 1: 0  Post Session:  0/10   Medications Last Reviewed:  2020  Updated Objective Findings:  None Today  TREATMENT:     NEUROMUSCULAR RE-EDUCATION: (40 minutes):  Exercise/activities per grid below to improve balance and coordination. Required minimal verbal cues to promote dynamic balance in standing.       Balance/Vestibular Treatment:   Activity   Date  20 Date  9/3/2020 Date  20 Date  9/10/20 Date  20 Date  20   Activity/Exercise   Sets/reps/equipment Sets/reps/  equipment Sets/reps/  equipment Sets/reps/  equipment Sets/reps/  equipment Sets/reps/  equipment   Walking with head turns     4 laps in hallway 4 laps in hallway 4 laps in hallway 4 laps in hallway 4 laps 4 laps    Walking with head up & down     4 laps in hallway 4 laps in hallway  4 laps in hallway 4 laps in hallway 4 laps 4 laps   Walking with diagonal head movements   4 laps in hallway 4 laps in hallway 4 laps 4 laps   Step overs     Over 1/2 foam roll x 20 reps fwd and laterally Over 1/2 foam roll x 20 reps fwd and laterally Over 1/2 foam roll x 20 reps fwd and laterally Over 1/2 foam roll x 20 reps fwd and laterally Over 1/2 foam roll x 20 reps fwd and laterally  Over 1/2 foam roll x 20 reps fwd and laterally    Step taps     6 inch step x 30 reps fwd and cross 6 inch step x 30 reps fwd and cross 6 inch step x 30 reps fwd and cross 6 inch step x 30 reps fwd and cross 6 inch step x 30 reps fwd and cross 6 inch step x 30 reps fwd and cross   Marching   4 laps in hallway 4 laps in hallway 4 laps in hallway 4 laps in hallway 4 laps in hallway 4 laps   Sidestepping   4 laps in hallway 4 laps in hallway 4 laps in hallway 4 laps in hallway 4 laps in hallway 4 laps   Crossovers     4 laps in hallway 2 laps in hallway  4 laps laps in hallway 2 llaps   [place]           Walking  backwards     4 laps in hallway 4 laps 4 laps in hallway 4 laps in hallway 4 laps in hallway 4 laps   Tandem walking    Semi tandem  4 laps in hallway Semi tandem  4 laps in hallway 4 laps in hallway 4 laps in hallway 4 laps   Weaving in/out of cones      4 laps in hallway 4 laps in hallway 4 laps in hallway 4 laps in hallway 4 laps   Picking up cones        8 cones 8 cones X   Sports cord             [employer]           Kick ball           Figure 8s            Circles right/left           Walking with 360 degree turns           Spirals           Weight shifting:    Left & Right     Blue foams and rockerboard eyes open Tiltboard Tiltboard Tiltboard     Weight shifting:   Forward & Backward      Blue foams and rockerboard eyes open Tiltboard Tiltboard Tiltboard Tiltboard Tiltboard   Static Standing Balance     Blue foams eyes open with head turns and up/down Tiltboard/sanddune eyes open with head turns; eyes closed Tiltboard/sanddune eyes open with head turns; eyes closed Tiltboard/sanddune eyes open with head turns; eyes closed Tiltboard/eyes open with head turns; eyes closed Tiltboard eyes open with head turns; eyes closed   Standing with feet apart             Standing with feet together             Standing with feet semitandem   Blue foams eyes open with head turns and up/down; blue foams eyes closed  Blue foam eyes open and eyes closed Blue foam eyes open and eyes closed  Blue foam eyes open and eyes closed   Standing with feet tandem           Single leg stance           Sit to stand from chair with out upper extremity support      10 reps       Hallpike-Rogers testing for BPPV (Benign Paroxysmal Positional Vertigo)             Tejada-Daroff exercises           Canalith Repositioning treatment/Epley Maneuver  for BPPV (Benign Paroxysmal Positional Vertigo)           [employer] Training: See scanned report. [employer] Portal  Treatment/Session Summary:    · Response to Treatment: Patient has difficulty with crossovers and step overs due to decreased coordination in bilateral lower extremity. Patient reports increased fatigue after treatment. · Communication/Consultation:  None today  · Equipment provided today:  None today  · Recommendations/Intent for next treatment session: Next visit will focus on balance exercises, habituation exercises, and gait.     Total Treatment Billable Duration:  40 minutes   PT Patient Time In/Time Out  Time In: 1020  Time Out: Tobin 51, PT    Future Appointments   Date Time Provider Fallon Beatty   10/1/2020  4:15 PM [id number] BLOOD DRAW [id number] PGU   10/8/2020  4:15 PM Sterrett, Franceen Angelucci,  [id number] PGU

## 2021-01-08 NOTE — THERAPY DISCHARGE
Venkatesh Fuller. : 1950  Primary: [facility]*  Secondary:  [address]  at [facility]  [address] [facility], [address] [place] [address],[address], [address]  [phone]   [phone]        OUTPATIENT PHYSICAL THERAPY:Discharge Summary   ICD-10: Treatment Diagnosis: Difficulty in walking, not elsewhere classified (R26.2)  Precautions/Allergies:   Aspirin and Sulfa (sulfonamide antibiotics)     Frequency/Duration: Patient discharged from physical therapy. MEDICAL/REFERRING DIAGNOSIS:  balance   DATE OF ONSET: 2020  REFERRING PHYSICIAN: Ava James MD MD Orders: Evaluate and treat   Return MD Appointment: unknown      INITIAL ASSESSMENT:  Mr. Tameka Ford presents with imbalance, dizziness, and difficulty walking. Patient is at higher fall risk based on history of multiple falls and score received on Dynamic Gait Index. Patient would benefit from skilled physical therapy to address problems and goals. Thank you for this referral.    Discharge note:  Patient attended seven scheduled physical therapy appointments from 2020 to 2020. Patient did not schedule additional appointments. Problems and goals unable to be reassessed. Patient discharged from physical therapy. Thank you for this referral.     PROBLEM LIST (Impacting functional limitations):  1. Decreased Ambulation Ability/Technique  2. Decreased Balance  3. Decreased [place] with Home Exercise Program  4. Increased dizziness INTERVENTIONS PLANNED: (Treatment may consist of any combination of the following)  1. Balance Exercise  2. Gait Training  3. Home Exercise Program (HEP)  4. Habituation exercises     GOALS: (Goals have been discussed and agreed upon with patient.)  Short-Term Functional Goals: Time Frame: 30 days   1. Patient will be independent with home exercise program to improve balance and decrease risk of falls with daily activities. Goal unable to be reassessed.   Discharge Goals: Time Frame: 90 days   1. Patient will score greater than or equal to 23/24 on Dynamic Gait Index indicating improved balance and decreased fall risk with daily activities. Goal unable to be reassessed. 2. Patient will score above average two out of three times on condition 6 of SMART [employer] Sensory Organization test indicating improved balance. Goal unable to be reassessed. 3. Patient will report improved stability and decreased falls with daily activities. Goal unable to be reassessed. OUTCOME MEASURE:   Tool Used: Dynamic Gait Index  Score:  Initial: 19/24 Most Recent: X/24 (Date: -- )   Interpretation of Score: Each section is scored on a 0-3 scale, 0 representing the patients inability to perform the task and 3 representing independence. The scores of each section are added together for a total score of 24. Any score below 19 indicates increased risk for falls. HISTORY:   History of Injury/Illness (Reason for Referral):  Patient complaints of dizziness, imbalance, and difficulty walking. Patient reports symptoms have gotten worse since March 2020. Patient has had ten falls over the past couple of months. Patient tends to lose his balance backwards. Patient reports dizziness as 3/10 and pain level as 0/10. Patient describes dizziness as lightheadedness with quick movements. Patient uses no assistive device during ambulation. Past Medical History/Comorbidities:   Mr. Kate Bertrand  has a past medical history of Cancer [facility]), Hypercholesteremia, Hypertension, Inguinal hernia (9/17/14), Personal history of prostate cancer, and Psychiatric disorder. Mr. Kate Bertrand  has a past surgical history that includes pr appendectomy; biopsy prostate; hx hernia repair (Left); and hx prostatectomy. Social History/Living Environment:     Lives with spouse in split level home. Prior Level of Function/Work/Activity:  Independent. Retired.     Dominant Side:         RIGHT  Personal Factors:          Sex:  male Age:  72 y.o. Ambulatory/Rehab Services H2 Model Falls Risk Assessment   Risk Factors:       (1)  Dizziness/Vertigo       (1)  Gender [Male]       (5)  History of Recent Falls [w/in 3 months] Ability to Rise from Chair:       (0)  Ability to rise in a single movement   Falls Prevention Plan:       Exercise/Equipment Adaptation (specify):  Patient will be supevised in the clinic at all times due to higher fall risk   Total: (5 or greater = High Risk): 7   ©2010 [facility] States Patent #[id number]. Federal Law prohibits the replication, distribution or use without written permission from [facility] [place]   Current Medications:       Current Outpatient Medications:     tiZANidine (ZANAFLEX) 4 mg capsule, Take 4 mg by mouth three (3) times daily. , Disp: , Rfl:     sildenafiL, pulmonary hypertension, (REVATIO) 20 mg tablet, Take 20 mg by mouth three (3) times daily. , Disp: , Rfl:     traMADoL (ULTRAM) 50 mg tablet, Take 50 mg by mouth every six (6) hours as needed for Pain., Disp: , Rfl:     famotidine (PEPCID) 20 mg tablet, Take 20 mg by mouth two (2) times a day., Disp: , Rfl:     folic acid 987 mcg tablet, Take 400 mcg by mouth daily. , Disp: , Rfl:     predniSONE (DELTASONE) 5 mg tablet, , Disp: , Rfl:     methotrexate (RHEUMATREX) 2.5 mg tablet, , Disp: , Rfl:     lamoTRIgine (LAMICTAL) 200 mg tablet, Take 400 mg by mouth nightly., Disp: , Rfl:     cholecalciferol (VITAMIN D3) 1,000 unit tablet, Take  by mouth daily. , Disp: , Rfl:     acetaminophen (TYLENOL EXTRA STRENGTH) 500 mg tablet, Take  by mouth every six (6) hours as needed for Pain., Disp: , Rfl:     diazepam (VALIUM) 5 mg tablet, Take 5 mg by mouth every six (6) hours as needed for Anxiety. Take 1 po tid prn, Disp: , Rfl:     QUEtiapine SR (SEROQUEL XR) 50 mg sr tablet, Take 50 mg by mouth nightly.  Indications: BIPOLAR DISORDER, also insomnia, Disp: , Rfl:     PSEUDOEPHEDRINE HCL, BULK,, Take 30 mg by mouth daily as needed. , Disp: , Rfl:     amLODIPine (NORVASC) 5 mg tablet, Take 5 mg by mouth nightly. Indications: HYPERTENSION, Disp: , Rfl:     atorvastatin (LIPITOR) 20 mg tablet, Take 20 mg by mouth nightly. Indications: HYPERCHOLESTEROLEMIA, Disp: , Rfl:     cetirizine (ZYRTEC) 10 mg tablet, Take 10 mg by mouth nightly. Indications: ALLERGIC RHINITIS, Disp: , Rfl:     zolpidem (AMBIEN) 10 mg tablet, Take  by mouth nightly as needed for Sleep., Disp: , Rfl:       Number of Personal Factors/Comorbidities that affect the Plan of Care: 1-2: MODERATE COMPLEXITY   EXAMINATION:   Functional Mobility:         Gait/Ambulation:  Patient ambulates with a normal gait pattern except with head/body turns. Strength:          Bilateral lower extremity strength 5/5. Sensation:         Within normal limits. Postural Control & Balance:  · [place] Balance Scale:  Not tested. · Dynamic Gait Index:  19/24.   (A score less than or equal to19/24 is abnormal and predictive of falls)     · [facility] Sensory Organization Test:  Objective findings indicate Normal use of somatosensory, Decreased use of visual, & Normal use of vestibular inputs to balance. Center of gravity is normal.  See scanned report. Patient unable to maintain his balance two out of three times on condition 6 of this test.  Patient scored 10% below norm composite score. Body Structures Involved:  1. Nerves  2. Eyes and Ears  3. Muscles Body Functions Affected:  1. Neuromusculoskeletal Activities and Participation Affected:  1. General Tasks and Demands  2. Mobility  3. Interpersonal Interactions and Relationships  4.  Community, Social and [place]   Number of elements (examined above) that affect the Plan of Care: 4+: HIGH COMPLEXITY   CLINICAL PRESENTATION:   Presentation: Evolving clinical presentation with changing clinical characteristics: MODERATE COMPLEXITY   CLINICAL DECISION MAKING:   Use of outcome tool(s) and clinical judgement create a POC that gives a: Questionable prediction of patient's progress: MODERATE COMPLEXITY

## 2021-02-18 ENCOUNTER — HOSPITAL ENCOUNTER (EMERGENCY)
Age: 71
Discharge: HOME | End: 2021-02-18
Attending: EMERGENCY MEDICINE
Payer: COMMERCIAL

## 2021-02-18 VITALS
BODY MASS INDEX: 18.36 KG/M2 | TEMPERATURE: 97.5 F | HEIGHT: 67 IN | WEIGHT: 117 LBS | RESPIRATION RATE: 16 BRPM | OXYGEN SATURATION: 97 % | HEART RATE: 82 BPM | DIASTOLIC BLOOD PRESSURE: 81 MMHG | SYSTOLIC BLOOD PRESSURE: 127 MMHG

## 2021-02-18 DIAGNOSIS — S01.01XA LACERATION OF SCALP, INITIAL ENCOUNTER: Primary | ICD-10-CM

## 2021-02-18 PROCEDURE — 75810000293 HC SIMP/SUPERF WND  RPR

## 2021-02-18 PROCEDURE — 99283 EMERGENCY DEPT VISIT LOW MDM: CPT

## 2021-02-18 NOTE — ED NOTES
I have reviewed discharge instructions with the patient and spouse. The patient and spouse verbalized understanding. Patient left ED via Discharge Method: wheelchair to Home with (spouse). Opportunity for questions and clarification provided. Patient given 0 scripts. No esign       To continue your aftercare when you leave the hospital, you may receive an automated call from our care team to check in on how you are doing. This is a free service and part of our promise to provide the best care and service to meet your aftercare needs.  If you have questions, or wish to unsubscribe from this service please call [phone]. Thank you for Choosing our [employer] [facility] Emergency Department.

## 2021-02-18 NOTE — ED PROVIDER NOTES
81-year-old  male with history of balance problems secondary to neurologic manifestations of Rigoberto's disease presents to the emergency department after losing balance when attempting to stand and falling backward and hitting it back of his head on the counter. Patient denies loss of consciousness and other than a mild discomfort where he hit his head, denies any headache, visual changes, paralysis or paresthesias. The history is provided by the patient. Fall  The accident occurred less than 1 hour ago. The fall occurred while standing. He fell from a height of ground level. He landed on hard floor (cabinet door). The point of impact was the head. The pain is present in the head. The pain is at a severity of 1/10. He was ambulatory at the scene. There was no entrapment after the fall. There was no drug use involved in the accident. There was no alcohol use involved in the accident. Associated symptoms include laceration. Pertinent negatives include no fever and no loss of consciousness. The risk factors include recurrent falls and being elderly. The symptoms are aggravated by pressure on injury. He has tried nothing for the symptoms. The treatment provided no relief.         Past Medical History:   Diagnosis Date    Cancer [facility])     prostate    Hypercholesteremia     Hypertension     Inguinal hernia 9/17/14    RIGHT    Neurological disorder     Whipple's disease    Personal history of prostate cancer     Psychiatric disorder     bipolar/ anxiety       Past Surgical History:   Procedure Laterality Date    BIOPSY PROSTATE      HX HERNIA REPAIR Left     LIH    HX PROSTATECTOMY      ND APPENDECTOMY           Family History:   Problem Relation Age of Onset    Diabetes Mother     Hypertension Mother     Heart Disease Father     Diabetes Sister     Diabetes Brother     Cancer Maternal Grandfather     Diabetes Maternal Grandfather        Social History     Socioeconomic History    Marital status:      Spouse name: Not on file    Number of children: Not on file    Years of education: Not on file    Highest education level: Not on file   Occupational History    Not on file   Social Needs    Financial resource strain: Not on file    Food insecurity     Worry: Not on file     Inability: Not on file    Transportation needs     Medical: Not on file     Non-medical: Not on file   Tobacco Use    Smoking status: Never Smoker    Smokeless tobacco: Never Used   Substance and Sexual Activity    Alcohol use: Yes     Comment: 1 drinks weekly    Drug use: No    Sexual activity: Yes   Lifestyle    Physical activity     Days per week: Not on file     Minutes per session: Not on file    Stress: Not on file   Relationships    Social connections     Talks on phone: Not on file     Gets together: Not on file     Attends Gnosticism service: Not on file     Active member of club or organization: Not on file     Attends meetings of clubs or organizations: Not on file     Relationship status: Not on file    Intimate partner violence     Fear of current or ex partner: Not on file     Emotionally abused: Not on file     Physically abused: Not on file     Forced sexual activity: Not on file   Other Topics Concern    Not on file   Social History Narrative    Not on file         ALLERGIES: Aspirin and Sulfa (sulfonamide antibiotics)    Review of Systems   Constitutional: Negative for chills and fever. Neurological: Negative for loss of consciousness. All other systems reviewed and are negative. Vitals:    02/18/21 1010   BP: 127/81   Pulse: 82   Resp: 16   Temp: 97.5 °F (36.4 °C)   SpO2: 97%   Weight: 53.1 kg (117 lb)   Height: 5' 7\" (1.702 m)            Physical Exam  Vitals signs and nursing note reviewed. Constitutional:       General: He is not in acute distress. Appearance: He is well-developed. HENT:      Head: Normocephalic and atraumatic.       Right Ear: External ear normal. Left Ear: External ear normal.   Eyes:      Extraocular Movements: Extraocular movements intact. Conjunctiva/sclera: Conjunctivae normal.      Pupils: Pupils are equal, round, and reactive to light. Neck:      Musculoskeletal: Normal range of motion and neck supple. Cardiovascular:      Rate and Rhythm: Normal rate and regular rhythm. Heart sounds: Normal heart sounds. Pulmonary:      Effort: Pulmonary effort is normal.      Breath sounds: Normal breath sounds. Musculoskeletal: Normal range of motion. Skin:     General: Skin is warm and dry. Capillary Refill: Capillary refill takes less than 2 seconds. Findings: Laceration present. Neurological:      General: No focal deficit present. Mental Status: He is alert and oriented to person, place, and time. Psychiatric:         Mood and Affect: Mood normal.         Behavior: Behavior normal.          MDM  Number of Diagnoses or Management Options  Laceration of scalp, initial encounter: new and does not require workup     Amount and/or Complexity of Data Reviewed  Review and summarize past medical records: yes    Risk of Complications, Morbidity, and/or Mortality  Presenting problems: low  Diagnostic procedures: minimal  Management options: low    Patient Progress  Patient progress: stable         Wound Repair    Date/Time: 2/18/2021 10:35 AM  Performed by: attendingLocation details: scalp  Wound length:2.5 cm or less  Foreign bodies: no foreign bodies  Irrigation solution: saline  Irrigation method: syringe  Debridement: none  Skin closure: staples  Number of sutures: 1  Technique: simple  My total time at bedside, performing this procedure was 1-15 minutes. At time of discharge, the patient denies any headache.   Offered CT here in the emergency department, family felt uncomfortable taking the patient home and should he develop more headache, nausea, change in mentation or nausea or vomiting they will bring him back for CT.

## 2021-02-18 NOTE — ED TRIAGE NOTES
Pt states he fell this morning and hit head on the cabinet. Lac to the back of head with bleeding controlled at this time. Denies LOC. Hx of Whipple's disease that was dx last week and is causing issues with balance. Pt has a mask for triage. soft/nontender/nondistended

## 2021-02-27 ENCOUNTER — HOSPITAL ENCOUNTER (EMERGENCY)
Age: 71
Discharge: HOME | End: 2021-02-27
Attending: EMERGENCY MEDICINE
Payer: COMMERCIAL

## 2021-02-27 VITALS
OXYGEN SATURATION: 96 % | RESPIRATION RATE: 16 BRPM | TEMPERATURE: 98.2 F | BODY MASS INDEX: 18.36 KG/M2 | HEIGHT: 67 IN | SYSTOLIC BLOOD PRESSURE: 112 MMHG | HEART RATE: 77 BPM | DIASTOLIC BLOOD PRESSURE: 72 MMHG | WEIGHT: 117 LBS

## 2021-02-27 DIAGNOSIS — Z48.02 ENCOUNTER FOR STAPLE REMOVAL: Primary | ICD-10-CM

## 2021-02-27 PROCEDURE — 75810000275 HC EMERGENCY DEPT VISIT NO LEVEL OF CARE

## 2021-02-27 NOTE — ED TRIAGE NOTES
Pt has one staple in head for nine days that needs to be removed. Wound appears closed and no bleeding or redness at this time. Pt has a mask for triage.

## 2021-02-27 NOTE — ED NOTES
I have reviewed discharge instructions with the patient. The patient verbalized understanding. Patient left ED via Discharge Method: ambulatory to Home with (spouse). Opportunity for questions and clarification provided. Patient given 0 scripts. To continue your aftercare when you leave the hospital, you may receive an automated call from our care team to check in on how you are doing. This is a free service and part of our promise to provide the best care and service to meet your aftercare needs.  If you have questions, or wish to unsubscribe from this service please call [phone]. Thank you for Choosing our [facility] Emergency Department.

## 2021-02-27 NOTE — ED PROVIDER NOTES
73 y/o m to ed for staple removal.  Has approximated well. No new neuro complaints.   Looks good           Past Medical History:   Diagnosis Date    Cancer [facility])     prostate    Hypercholesteremia     Hypertension     Inguinal hernia 9/17/14    RIGHT    Neurological disorder     Whipple's disease    Personal history of prostate cancer     Psychiatric disorder     bipolar/ anxiety       Past Surgical History:   Procedure Laterality Date    BIOPSY PROSTATE      HX HERNIA REPAIR Left     LIH    HX PROSTATECTOMY      MO APPENDECTOMY           Family History:   Problem Relation Age of Onset    Diabetes Mother     Hypertension Mother     Heart Disease Father     Diabetes Sister     Diabetes Brother     Cancer Maternal Grandfather     Diabetes Maternal Grandfather        Social History     Socioeconomic History    Marital status:      Spouse name: Not on file    Number of children: Not on file    Years of education: Not on file    Highest education level: Not on file   Occupational History    Not on file   Social Needs    Financial resource strain: Not on file    Food insecurity     Worry: Not on file     Inability: Not on file    Transportation needs     Medical: Not on file     Non-medical: Not on file   Tobacco Use    Smoking status: Never Smoker    Smokeless tobacco: Never Used   Substance and Sexual Activity    Alcohol use: Yes     Comment: 1 drinks weekly    Drug use: No    Sexual activity: Yes   Lifestyle    Physical activity     Days per week: Not on file     Minutes per session: Not on file    Stress: Not on file   Relationships    Social connections     Talks on phone: Not on file     Gets together: Not on file     Attends Denominational service: Not on file     Active member of club or organization: Not on file     Attends meetings of clubs or organizations: Not on file     Relationship status: Not on file    Intimate partner violence     Fear of current or ex partner: Not on file     Emotionally abused: Not on file     Physically abused: Not on file     Forced sexual activity: Not on file   Other Topics Concern    Not on file   Social History Narrative    Not on file         ALLERGIES: Aspirin and Sulfa (sulfonamide antibiotics)    Review of Systems   Skin: Positive for wound. Vitals:    02/27/21 1459   BP: 112/72   Pulse: 77   Resp: 16   Temp: 98.2 °F (36.8 °C)   SpO2: 96%   Weight: 53.1 kg (117 lb)   Height: 5' 7\" (1.702 m)            Physical Exam  Vitals signs and nursing note reviewed. Constitutional:       General: He is not in acute distress. Appearance: He is well-developed. HENT:      Head: Normocephalic. Right Ear: External ear normal.      Left Ear: External ear normal.      Nose: Nose normal.   Eyes:      Conjunctiva/sclera: Conjunctivae normal.      Pupils: Pupils are equal, round, and reactive to light. Cardiovascular:      Rate and Rhythm: Normal rate. Pulmonary:      Effort: Pulmonary effort is normal. No respiratory distress. Musculoskeletal: Normal range of motion. Skin:     General: Skin is warm and dry. Findings: No rash. Neurological:      Mental Status: He is alert and oriented to person, place, and time. Cranial Nerves: No cranial nerve deficit. Coordination: Coordination normal.   Psychiatric:         Behavior: Behavior normal.         Thought Content:  Thought content normal.         Judgment: Judgment normal.          MDM  Number of Diagnoses or Management Options  Diagnosis management comments: Staple removal    Risk of Complications, Morbidity, and/or Mortality  Presenting problems: minimal  Diagnostic procedures: minimal  Management options: minimal           Procedures

## 2021-03-25 ENCOUNTER — HOSPITAL ENCOUNTER (OUTPATIENT)
Dept: MEDSURG UNIT | Age: 71
Discharge: HOME | End: 2021-03-25
Payer: COMMERCIAL

## 2021-03-25 PROCEDURE — 36573 INSJ PICC RS&I 5 YR+: CPT | Performed by: INTERNAL MEDICINE

## 2021-03-25 PROCEDURE — C1751 CATH, INF, PER/CENT/MIDLINE: HCPCS

## 2021-03-25 NOTE — PROGRESS NOTES
PICC Placement Note    PRE-PROCEDURE VERIFICATION  Correct Procedure: yes. Time out completed with assistant Gregory Ronquillo and all persons present in agreement with time out. Correct Site:  yes  Temperature:  , Temperature Source:    No results for input(s): BUN, CREA, PLT, INR, WBC, PLTEXT, INREXT in the last 72 hours. No lab exists for component: APTHR  Allergies: Aspirin and Sulfa (sulfonamide antibiotics)  Education materials for [facility] Care given to patient or family. PROCEDURE DETAIL  A double lumen PICC line was started for antibiotic therapy. The following documentation is in addition to the PICC properties in the lines/airways flowsheet :  Lot #: [id number]  xylocaine used: yes  Mid-Arm Circumference: 25 (cm)  Internal Catheter Length: 38 (cm)  Internal Catheter Total Length: 38 (cm)  Vein Selection for PICC:right basilic  Central Line Bundle followed yes  Complication Related to Insertion: none  Both the insertion guidewire and ECG guidewire were removed intact all ports have positive blood return and were flush well with normal saline. The location of the tip of the PICC is verified using ECG technology. The tip is in the SVC per ECG reading. See image below.              Line is okay to use: yes

## 2021-06-13 ENCOUNTER — HOSPITAL ENCOUNTER (EMERGENCY)
Age: 71
Discharge: HOME | End: 2021-06-13
Attending: EMERGENCY MEDICINE
Payer: COMMERCIAL

## 2021-06-13 ENCOUNTER — APPOINTMENT (OUTPATIENT)
Dept: CT IMAGING | Age: 71
End: 2021-06-13
Attending: EMERGENCY MEDICINE
Payer: COMMERCIAL

## 2021-06-13 VITALS
TEMPERATURE: 97.8 F | SYSTOLIC BLOOD PRESSURE: 142 MMHG | HEART RATE: 67 BPM | BODY MASS INDEX: 17.27 KG/M2 | OXYGEN SATURATION: 99 % | HEIGHT: 67 IN | DIASTOLIC BLOOD PRESSURE: 78 MMHG | WEIGHT: 110 LBS | RESPIRATION RATE: 16 BRPM

## 2021-06-13 DIAGNOSIS — R29.6 MULTIPLE FALLS: Primary | ICD-10-CM

## 2021-06-13 DIAGNOSIS — S01.01XA LACERATION OF SCALP WITHOUT FOREIGN BODY, INITIAL ENCOUNTER: ICD-10-CM

## 2021-06-13 PROCEDURE — 72125 CT NECK SPINE W/O DYE: CPT

## 2021-06-13 PROCEDURE — 75810000293 HC SIMP/SUPERF WND  RPR

## 2021-06-13 PROCEDURE — 99283 EMERGENCY DEPT VISIT LOW MDM: CPT

## 2021-06-13 PROCEDURE — 70450 CT HEAD/BRAIN W/O DYE: CPT

## 2021-06-13 NOTE — ED PROVIDER NOTES
Mask was worn during the entire patient examination. Charlcie Heimlich is a 80 y.o. male who presents to the ED with a chief complaint of head injury. Patient has history of unsteady balance and multiple falls. He states he has Whipple's disease in the brain and this is resulting in chronic unsteadiness. He has walkers at home typically uses hiking sticks to balance with. He tripped on a pill bottle that had fallen to the floor today and fell backwards hitting the back of his head. He had some bleeding and swelling to the posterior aspect of the head with some moderate pain. He had no LOC and is not taking any blood thinners.            Past Medical History:   Diagnosis Date    Cancer [facility])     prostate    Hypercholesteremia     Hypertension     Inguinal hernia 9/17/14    RIGHT    Neurological disorder     Whipple's disease    Personal history of prostate cancer     Psychiatric disorder     bipolar/ anxiety       Past Surgical History:   Procedure Laterality Date    BIOPSY PROSTATE      HX HERNIA REPAIR Left     LIH    HX PROSTATECTOMY      [place] APPENDECTOMY           Family History:   Problem Relation Age of Onset    Diabetes Mother     Hypertension Mother     Heart Disease Father     Diabetes Sister     Diabetes Brother     Cancer Maternal Grandfather     Diabetes Maternal Grandfather        Social History     Socioeconomic History    Marital status:      Spouse name: Not on file    Number of children: Not on file    Years of education: Not on file    Highest education level: Not on file   Occupational History    Not on file   Tobacco Use    Smoking status: Never Smoker    Smokeless tobacco: Never Used   Substance and Sexual Activity    Alcohol use: Yes     Comment: 1 drinks weekly    Drug use: No    Sexual activity: Yes   Other Topics Concern    Not on file   Social History Narrative    Not on file     Social Determinants of Health     Financial Resource Strain:    Luz Officer Difficulty of Paying Living Expenses:    Food Insecurity:     Worried About Running Out of Food in the Last Year:     [address] in the Last Year:    Transportation Needs:     Lack of Transportation (Medical):  Lack of Transportation (Non-Medical):    Physical Activity:     Days of Exercise per Week:     Minutes of Exercise per Session:    Stress:     Feeling of Stress :    Social Connections:     Frequency of Communication with Friends and Family:     Frequency of Social Gatherings with Friends and Family:     Attends Taoist Services:     Active Member of Clubs or Organizations:     Attends Club or Organization Meetings:     Marital Status:    Intimate Partner Violence:     Fear of Current or Ex-Partner:     Emotionally Abused:     Physically Abused:     Sexually Abused: ALLERGIES: Aspirin and Sulfa (sulfonamide antibiotics)    Review of Systems   Constitutional: Negative for activity change, chills, fatigue and fever. HENT: Negative for congestion. Respiratory: Negative for chest tightness, shortness of breath, wheezing and stridor. Cardiovascular: Negative for chest pain, palpitations and leg swelling. Gastrointestinal: Negative for abdominal pain, constipation, diarrhea, nausea and vomiting. Genitourinary: Negative for difficulty urinating and flank pain. Musculoskeletal: Negative for arthralgias, joint swelling, myalgias, neck pain and neck stiffness. Skin: Negative for color change, pallor and wound. Neurological: Negative for dizziness, weakness, light-headedness, numbness and headaches. Psychiatric/Behavioral: Negative for agitation. All other systems reviewed and are negative. Vitals:    06/13/21 0923   BP: (!) 150/72   Pulse: 69   Resp: 18   Temp: 98.1 °F (36.7 °C)   SpO2: 97%   Weight: 49.9 kg (110 lb)   Height: 5' 7\" (1.702 m)            Physical Exam  Vitals and nursing note reviewed.    Constitutional:       General: He is not in acute distress. Appearance: Normal appearance. He is well-developed. He is not ill-appearing, toxic-appearing or diaphoretic. HENT:      Head: Normocephalic. Comments: 2 cm laceration to the posterior aspect of the head small oozing of blood. No other injury seen. Eyes:      General: No scleral icterus. Conjunctiva/sclera: Conjunctivae normal.   Neck:      Trachea: No tracheal deviation. Cardiovascular:      Rate and Rhythm: Normal rate and regular rhythm. Heart sounds: No murmur heard. No friction rub. No gallop. Pulmonary:      Effort: Pulmonary effort is normal. No respiratory distress. Breath sounds: No stridor. No wheezing, rhonchi or rales. Chest:      Chest wall: No tenderness. Abdominal:      General: Abdomen is flat. There is no distension. Tenderness: There is no abdominal tenderness. There is no guarding or rebound. Hernia: No hernia is present. Musculoskeletal:         General: No swelling, tenderness, deformity or signs of injury. Normal range of motion. Cervical back: Normal range of motion and neck supple. No rigidity or tenderness. Comments: I have palpated all long bones and there was no tenderness present. I have ranged all extremity joints as well and there was full range of motion and no pain when ranging the joints. Skin:     General: Skin is warm. Capillary Refill: Capillary refill takes less than 2 seconds. Coloration: Skin is not jaundiced or pale. Findings: No bruising or erythema. Neurological:      General: No focal deficit present. Mental Status: He is alert and oriented to person, place, and time. Mental status is at baseline. Psychiatric:         Mood and Affect: Mood normal.         Behavior: Behavior normal.          MDM  Number of Diagnoses or Management Options  Laceration of scalp without foreign body, initial encounter  Multiple falls  Diagnosis management comments: Patient has negative CT scans.   He does have wound that I repaired with staples will have him come back in 2 weeks for this. He is going home with family whom seems to be a great caretaker. Princess Shannan MD; 6/13/2021 @11:01 AM Voice dictation software was used during the making of this note. This software is not perfect and grammatical and other typographical errors may be present. This note has not been proofread for errors.  ===================================================================          Amount and/or Complexity of Data Reviewed  Tests in the radiology section of CPT®: ordered and reviewed (CT HEAD WO CONT    Result Date: 6/13/2021  CT HEAD WITHOUT CONTRAST HISTORY:  Head trauma after a fall. . COMPARISON: None. TECHNIQUE: Axial imaging was performed without intravenous contrast utilizing 5mm slice thickness. Sagittal and coronal reformats were performed. Radiation dose reduction techniques were used for this study. Our CT scanner uses one or all of the following: Automated exposure control, adjustment of the MAS or KUB according to patient's size and iterative reconstruction. FINDINGS:    *BRAIN:    -  There are no early signs of territorial or lacunar infarction by CT.    -  No intracranial mass, hematoma, or hydrocephalus.    -  No gross white matter abnormality by CT. *VISUALIZED PARANASAL SINUSES: Well aerated. *MASTOIDS:  Clear. *CALVARIUM AND SCALP: Unremarkable. Unremarkable brain CT without intravenous contrast. Date of Dictation: 6/13/2021 10:36 AM     CT SPINE CERV WO CONT    Result Date: 6/13/2021  CT CERVICAL SPINE WITHOUT CONTRAST HISTORY: Neck pain after a fall. . COMPARISON: None. TECHNIQUE: Helical imaging was performed through the cervical spine and reconstructed in multiple planes. Dose reduction techniques used: Automated exposure control, adjustment of the mAs and/or kVp according to patient's size, and iterative reconstruction techniques. FINDINGS: *  ALIGNMENT: Within normal limits.  *  FRACTURES: None. *  PREVERTEBRAL SOFT TISSUES: No swelling. The disc evaluation is suboptimal by CT. C2-C3: Left facet arthropathy. C3-C4: Right facet arthropathy. C4-C5: Right facet arthropathy. C5-C6: Spondylosis. C6-C7: Unremarkable. C7-T1: Unremarkable. Spondylosis and facet arthropathy. No fractures are identified. Date of Dictation: 6/13/2021 10:37 AM    )           Wound Repair    Date/Time: 6/13/2021 10:55 AM  Performed by: attendingPreparation: skin prepped with Betadine  Location details: scalp  Wound length:2.5 cm or less (2 cm)  Foreign bodies: no foreign bodies  Debridement: none  Skin closure: staples  Number of sutures: 3. Technique: interrupted  Approximation: close  Patient tolerance: patient tolerated the procedure well with no immediate complications  My total time at bedside, performing this procedure was 1-15 minutes.

## 2021-06-13 NOTE — ED TRIAGE NOTES
Patient has history of Whipple disease, and has underlying balance issues and dizziness. Patient was \"kicking\" a pill bottle and fell backwards hitting head on furniture. Patient denies any LOC, ambulatory to triage.  Masked

## 2021-06-25 ENCOUNTER — HOSPITAL ENCOUNTER (EMERGENCY)
Age: 71
Discharge: HOME | End: 2021-06-25
Attending: EMERGENCY MEDICINE
Payer: COMMERCIAL

## 2021-06-25 VITALS
HEIGHT: 67 IN | BODY MASS INDEX: 17.89 KG/M2 | TEMPERATURE: 98.3 F | SYSTOLIC BLOOD PRESSURE: 127 MMHG | WEIGHT: 114 LBS | RESPIRATION RATE: 20 BRPM | HEART RATE: 61 BPM | DIASTOLIC BLOOD PRESSURE: 76 MMHG | OXYGEN SATURATION: 97 %

## 2021-06-25 DIAGNOSIS — Z48.02 ENCOUNTER FOR STAPLE REMOVAL: Primary | ICD-10-CM

## 2021-06-25 PROCEDURE — 75810000275 HC EMERGENCY DEPT VISIT NO LEVEL OF CARE

## 2021-06-25 NOTE — ED NOTES
I have reviewed discharge instructions with the patient. The patient verbalized understanding. Patient left ED via Discharge Method: ambulatory to Home with wife). Opportunity for questions and clarification provided. Patient given 0 scripts. To continue your aftercare when you leave the hospital, you may receive an automated call from our care team to check in on how you are doing. This is a free service and part of our promise to provide the best care and service to meet your aftercare needs.  If you have questions, or wish to unsubscribe from this service please call [phone]. Thank you for Choosing our [facility] Department.

## 2021-06-25 NOTE — ED PROVIDER NOTES
Patient here for staple removal scalp. Placed about 2 weeks ago. Denies any complaints    The history is provided by the patient. Staple Removal   This is a new problem. The current episode started more than 1 week ago. The problem occurs constantly. The problem has been rapidly improving. The pain is at a severity of 0/10. The patient is experiencing no pain. Treatments tried: staples. The treatment provided significant relief. There has been a history of trauma.         Past Medical History:   Diagnosis Date    Cancer [facility])     prostate    Hypercholesteremia     Hypertension     Inguinal hernia 9/17/14    RIGHT    Neurological disorder     Whipple's disease    Personal history of prostate cancer     Psychiatric disorder     bipolar/ anxiety       Past Surgical History:   Procedure Laterality Date    BIOPSY PROSTATE      HX HERNIA REPAIR Left     LIH    HX PROSTATECTOMY      [place] APPENDECTOMY           Family History:   Problem Relation Age of Onset    Diabetes Mother     Hypertension Mother     Heart Disease Father     Diabetes Sister     Diabetes Brother     Cancer Maternal Grandfather     Diabetes Maternal Grandfather        Social History     Socioeconomic History    Marital status:      Spouse name: Not on file    Number of children: Not on file    Years of education: Not on file    Highest education level: Not on file   Occupational History    Not on file   Tobacco Use    Smoking status: Never Smoker    Smokeless tobacco: Never Used   Substance and Sexual Activity    Alcohol use: Yes     Comment: 1 drinks weekly    Drug use: No    Sexual activity: Yes   Other Topics Concern    Not on file   Social History Narrative    Not on file     Social Determinants of Health     Financial Resource Strain:     Difficulty of Paying Living Expenses:    Food Insecurity:     Worried About Running Out of Food in the Last Year:     Arnoldo of Food in the Last Year:    Transportation Needs:     Lack of Transportation (Medical):  Lack of Transportation (Non-Medical):    Physical Activity:     Days of Exercise per Week:     Minutes of Exercise per Session:    Stress:     Feeling of Stress :    Social Connections:     Frequency of Communication with Friends and Family:     Frequency of Social Gatherings with Friends and Family:     Attends Rest[employer] Services:     Active Member of Clubs or Organizations:     Attends Club or Organization Meetings:     Marital Status:    Intimate Partner Violence:     Fear of Current or Ex-Partner:     Emotionally Abused:     Physically Abused:     Sexually Abused: ALLERGIES: Aspirin and Sulfa (sulfonamide antibiotics)    Review of Systems   All other systems reviewed and are negative. Vitals:    06/25/21 1626   BP: 127/76   Pulse: 61   Resp: 20   Temp: 98.3 °F (36.8 °C)   SpO2: 97%   Weight: 51.7 kg (114 lb)   Height: 5' 7\" (1.702 m)            Physical Exam  Vitals and nursing note reviewed. Constitutional:       General: He is not in acute distress. Appearance: Normal appearance. He is well-developed and normal weight. He is not diaphoretic. HENT:      Head: Normocephalic. Comments: 3 staples noted to the occipital scalp area wound is healing well  Eyes:      Pupils: Pupils are equal, round, and reactive to light. Cardiovascular:      Rate and Rhythm: Normal rate and regular rhythm. Pulmonary:      Effort: Pulmonary effort is normal.      Breath sounds: Normal breath sounds. Abdominal:      General: Bowel sounds are normal.      Palpations: Abdomen is soft. Musculoskeletal:         General: Normal range of motion. Cervical back: Normal range of motion and neck supple. Skin:     General: Skin is warm. Neurological:      Mental Status: He is alert and oriented to person, place, and time.           MDM  Number of Diagnoses or Management Options  Diagnosis management comments: Staples removed without complication       Amount and/or Complexity of Data Reviewed  Review and summarize past medical records: yes    Risk of Complications, Morbidity, and/or Mortality  Presenting problems: minimal  Diagnostic procedures: minimal  Management options: minimal    Patient Progress  Patient progress: improved         Procedures

## 2021-07-19 PROCEDURE — 87798 DETECT AGENT NOS DNA AMP: CPT

## 2021-07-19 PROCEDURE — 88305 TISSUE EXAM BY PATHOLOGIST: CPT

## 2021-07-20 ENCOUNTER — HOSPITAL ENCOUNTER (OUTPATIENT)
Dept: LAB | Age: 71
Discharge: HOME | End: 2021-07-20

## 2021-07-20 PROCEDURE — 87798 DETECT AGENT NOS DNA AMP: CPT

## 2021-07-21 ENCOUNTER — HOSPITAL ENCOUNTER (OUTPATIENT)
Dept: LAB | Age: 71
Discharge: HOME | End: 2021-07-21
Payer: COMMERCIAL

## 2021-07-21 PROCEDURE — 87798 DETECT AGENT NOS DNA AMP: CPT

## 2021-08-20 LAB
Lab: NORMAL
REFERENCE LAB,REFLB: NORMAL
TEST DESCRIPTION:,ATST: NORMAL

## 2021-08-29 ENCOUNTER — HOSPITAL ENCOUNTER (EMERGENCY)
Age: 71
Discharge: HOME | End: 2021-08-29
Attending: EMERGENCY MEDICINE
Payer: COMMERCIAL

## 2021-08-29 ENCOUNTER — APPOINTMENT (OUTPATIENT)
Dept: CT IMAGING | Age: 71
End: 2021-08-29
Attending: EMERGENCY MEDICINE
Payer: COMMERCIAL

## 2021-08-29 VITALS
RESPIRATION RATE: 18 BRPM | OXYGEN SATURATION: 99 % | DIASTOLIC BLOOD PRESSURE: 78 MMHG | TEMPERATURE: 98.8 F | SYSTOLIC BLOOD PRESSURE: 126 MMHG | HEART RATE: 65 BPM

## 2021-08-29 DIAGNOSIS — S01.01XA LACERATION OF SCALP, INITIAL ENCOUNTER: Primary | ICD-10-CM

## 2021-08-29 PROCEDURE — 74011000250 HC RX REV CODE- 250: Performed by: EMERGENCY MEDICINE

## 2021-08-29 PROCEDURE — 99283 EMERGENCY DEPT VISIT LOW MDM: CPT

## 2021-08-29 PROCEDURE — 75810000293 HC SIMP/SUPERF WND  RPR

## 2021-08-29 PROCEDURE — 70450 CT HEAD/BRAIN W/O DYE: CPT

## 2021-08-29 RX ADMIN — Medication 3 ML: at 22:00

## 2021-08-30 NOTE — ED PROVIDER NOTES
81-year-old gentleman presents with concerns about a small scalp laceration after falling back and hitting his head. 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 He says that will sometimes make him on balance when he tries to stand up. He notes he was kneeling in the refrigerator and then when he stood up he fell back and hit his head. He denies loss of consciousness. He is not on any blood thinners. He denies any neck pain. He has been up walking around since he fell. No other associated symptoms. Elements of this note were created using speech recognition software. As such, errors of speech recognition may be present.            Past Medical History:   Diagnosis Date    Cancer [facility])     prostate    Hypercholesteremia     Hypertension     Inguinal hernia 9/17/14    RIGHT    Neurological disorder     Whipple's disease    Personal history of prostate cancer     Psychiatric disorder     bipolar/ anxiety       Past Surgical History:   Procedure Laterality Date    BIOPSY PROSTATE      HX HERNIA REPAIR Left     LIH    HX PROSTATECTOMY      [place] APPENDECTOMY           Family History:   Problem Relation Age of Onset    Diabetes Mother     Hypertension Mother     Heart Disease Father     Diabetes Sister     Diabetes Brother     Cancer Maternal Grandfather     Diabetes Maternal Grandfather        Social History     Socioeconomic History    Marital status:      Spouse name: Not on file    Number of children: Not on file    Years of education: Not on file    Highest education level: Not on file   Occupational History    Not on file   Tobacco Use    Smoking status: Never Smoker    Smokeless tobacco: Never Used   Substance and Sexual Activity    Alcohol use: Yes     Comment: 1 drinks weekly    Drug use: No    Sexual activity: Yes   Other Topics Concern    Not on file   Social History Narrative    Not on file     Social Determinants of Health     Financial Resource Strain:     Difficulty of Paying Living Expenses:    Food Insecurity:     Worried About Running Out of Food in the Last Year:     [address] in the Last Year:    Transportation Needs:     Lack of Transportation (Medical):  Lack of Transportation (Non-Medical):    Physical Activity:     Days of Exercise per Week:     Minutes of Exercise per Session:    Stress:     Feeling of Stress :    Social Connections:     Frequency of Communication with Friends and Family:     Frequency of Social Gatherings with Friends and Family:     Attends Advent Services:     Active Member of Clubs or Organizations:     Attends Club or Organization Meetings:     Marital Status:    Intimate Partner Violence:     Fear of Current or Ex-Partner:     Emotionally Abused:     Physically Abused:     Sexually Abused: ALLERGIES: Aspirin and Sulfa (sulfonamide antibiotics)    Review of Systems   Constitutional: Negative for chills and fever. Cardiovascular: Negative for chest pain and palpitations. Skin: Positive for wound. Negative for color change. Neurological: Positive for headaches. Negative for dizziness and weakness. Vitals:    08/29/21 2034   BP: 126/78   Pulse: 65   Resp: 18   Temp: 98.9 °F (37.2 °C)   SpO2: 96%            Physical Exam  Vitals and nursing note reviewed. Constitutional:       Appearance: Normal appearance. HENT:      Head:      Comments: 1 cm posterior scalp laceration  Neurological:      General: No focal deficit present. Mental Status: He is alert and oriented to person, place, and time. MDM  Number of Diagnoses or Management Options  Diagnosis management comments: I will get a CT scan of his head and place a staple in the laceration. ED Course as of Aug 29 2215   Miriam More Aug 29, 2021   2213 Head CT scan negative.   I will discharge him home.    [AC]      ED Course User Index  [AC] Gabe Brand MD       Wound Repair    Date/Time: 8/29/2021 9:43 PM  Performed by: dayoLocation details: scalp  Wound length:2.5 cm or less  Foreign bodies: no foreign bodies  Irrigation solution: saline  Debridement: none  Skin closure: staples  Number of sutures: 1  Approximation: close  Dressing: antibiotic ointment  My total time at bedside, performing this procedure was 1-15 minutes.

## 2021-08-30 NOTE — ED TRIAGE NOTES
Pt reports kneeling in fridge and stood up and feel backwards and hit  head, head laceration. Denies blood thinners, denies loc.   Bleeding controlled

## 2021-08-30 NOTE — DISCHARGE INSTRUCTIONS
Keep your wound clean with soap and water and apply an antibacterial ointment twice daily. Wash off the medication that we put on it about 10 minutes after you get home. Have the staple removed in 7 to 10 days.

## 2021-09-08 ENCOUNTER — HOSPITAL ENCOUNTER (EMERGENCY)
Age: 71
Discharge: HOME | End: 2021-09-08
Attending: EMERGENCY MEDICINE
Payer: COMMERCIAL

## 2021-09-08 VITALS
SYSTOLIC BLOOD PRESSURE: 115 MMHG | HEART RATE: 74 BPM | DIASTOLIC BLOOD PRESSURE: 74 MMHG | RESPIRATION RATE: 18 BRPM | TEMPERATURE: 98.9 F

## 2021-09-08 DIAGNOSIS — Z48.02 REMOVAL OF STAPLE: Primary | ICD-10-CM

## 2021-09-08 PROCEDURE — 75810000275 HC EMERGENCY DEPT VISIT NO LEVEL OF CARE

## 2021-09-08 NOTE — ED PROVIDER NOTES
Pt had suture placed to scalp 1 week ago , here for removal       Suture Removal  This is a new problem. The current episode started more than 1 week ago. The problem occurs constantly. The problem has not changed since onset. Nothing aggravates the symptoms. Nothing relieves the symptoms. Treatments tried: staple. The treatment provided significant relief. Past Medical History:   Diagnosis Date    Cancer [facility])     prostate    Hypercholesteremia     Hypertension     Inguinal hernia 9/17/14    RIGHT    Neurological disorder     Whipple's disease    Personal history of prostate cancer     Psychiatric disorder     bipolar/ anxiety       Past Surgical History:   Procedure Laterality Date    BIOPSY PROSTATE      HX HERNIA REPAIR Left     LIH    HX PROSTATECTOMY      [place] APPENDECTOMY           Family History:   Problem Relation Age of Onset    Diabetes Mother     Hypertension Mother     Heart Disease Father     Diabetes Sister     Diabetes Brother     Cancer Maternal Grandfather     Diabetes Maternal Grandfather        Social History     Socioeconomic History    Marital status:      Spouse name: Not on file    Number of children: Not on file    Years of education: Not on file    Highest education level: Not on file   Occupational History    Not on file   Tobacco Use    Smoking status: Never Smoker    Smokeless tobacco: Never Used   Substance and Sexual Activity    Alcohol use: Yes     Comment: 1 drinks weekly    Drug use: No    Sexual activity: Yes   Other Topics Concern    Not on file   Social History Narrative    Not on file     Social Determinants of Health     Financial Resource Strain:     Difficulty of Paying Living Expenses:    Food Insecurity:     Worried About Running Out of Food in the Last Year:     Ran Out of Food in the Last Year:    Transportation Needs:     Lack of Transportation (Medical):      Lack of Transportation (Non-Medical):    Physical Activity:     Days of Exercise per Week:     Minutes of Exercise per Session:    Stress:     Feeling of Stress :    Social Connections:     Frequency of Communication with Friends and Family:     Frequency of Social Gatherings with Friends and Family:     Attends Mormonism Services:     Active Member of Clubs or Organizations:     Attends Club or Organization Meetings:     Marital Status:    Intimate Partner Violence:     Fear of Current or Ex-Partner:     Emotionally Abused:     Physically Abused:     Sexually Abused: ALLERGIES: Aspirin and Sulfa (sulfonamide antibiotics)    Review of Systems   All other systems reviewed and are negative. There were no vitals filed for this visit. Physical Exam  Vitals and nursing note reviewed. Constitutional:       General: He is not in acute distress. Appearance: Normal appearance. He is well-developed and normal weight. He is not diaphoretic. HENT:      Head: Normocephalic. Comments: Healed wound to scalp, single staple in place   Eyes:      Pupils: Pupils are equal, round, and reactive to light. Cardiovascular:      Rate and Rhythm: Normal rate and regular rhythm. Pulmonary:      Effort: Pulmonary effort is normal.      Breath sounds: Normal breath sounds. Abdominal:      General: Bowel sounds are normal.      Palpations: Abdomen is soft. Musculoskeletal:         General: Normal range of motion. Cervical back: Normal range of motion and neck supple. Skin:     General: Skin is warm. Neurological:      Mental Status: He is alert and oriented to person, place, and time.           MDM  Number of Diagnoses or Management Options  Diagnosis management comments: Staple removed w/o complication        Amount and/or Complexity of Data Reviewed  Review and summarize past medical records: yes    Risk of Complications, Morbidity, and/or Mortality  Presenting problems: minimal  Diagnostic procedures: minimal  Management options: minimal    Patient Progress  Patient progress: improved         Procedures

## 2022-03-19 PROBLEM — E78.5 DYSLIPIDEMIA: Status: ACTIVE | Noted: 2017-01-03

## 2022-03-19 PROBLEM — I10 ESSENTIAL HYPERTENSION: Status: ACTIVE | Noted: 2017-01-03

## 2022-11-25 NOTE — ED NOTES
I have reviewed discharge instructions with the patient. The patient verbalized understanding. Patient left ED via Discharge Method: ambulatory to Home with self. Opportunity for questions and clarification provided. Patient given 0 scripts. To continue your aftercare when you leave the hospital, you may receive an automated call from our care team to check in on how you are doing. This is a free service and part of our promise to provide the best care and service to meet your aftercare needs.  If you have questions, or wish to unsubscribe from this service please call [phone]. Thank you for Choosing our [facility] Emergency Department.
25-Nov-2022 11:32

## 2023-10-20 ENCOUNTER — OFFICE VISIT (OUTPATIENT)
Dept: UROLOGY | Age: 73
End: 2023-10-20
Payer: MEDICARE

## 2023-10-20 DIAGNOSIS — R32 URINARY INCONTINENCE, UNSPECIFIED TYPE: Primary | ICD-10-CM

## 2023-10-20 DIAGNOSIS — C61 PROSTATE CANCER (HCC): ICD-10-CM

## 2023-10-20 DIAGNOSIS — N39.41 URGE INCONTINENCE: ICD-10-CM

## 2023-10-20 LAB
BILIRUBIN, URINE, POC: NEGATIVE
BLOOD URINE, POC: NEGATIVE
GLUCOSE URINE, POC: NEGATIVE
KETONES, URINE, POC: NEGATIVE
LEUKOCYTE ESTERASE, URINE, POC: NEGATIVE
NITRITE, URINE, POC: NEGATIVE
PH, URINE, POC: 7.5 (ref 4.6–8)
PROTEIN,URINE, POC: NEGATIVE
PVR, POC: 39 CC
SPECIFIC GRAVITY, URINE, POC: 1.02 (ref 1–1.03)
URINALYSIS CLARITY, POC: NORMAL
URINALYSIS COLOR, POC: NORMAL
UROBILINOGEN, POC: NORMAL

## 2023-10-20 PROCEDURE — 3017F COLORECTAL CA SCREEN DOC REV: CPT | Performed by: UROLOGY

## 2023-10-20 PROCEDURE — G8427 DOCREV CUR MEDS BY ELIG CLIN: HCPCS | Performed by: UROLOGY

## 2023-10-20 PROCEDURE — G8421 BMI NOT CALCULATED: HCPCS | Performed by: UROLOGY

## 2023-10-20 PROCEDURE — 1123F ACP DISCUSS/DSCN MKR DOCD: CPT | Performed by: UROLOGY

## 2023-10-20 PROCEDURE — 1036F TOBACCO NON-USER: CPT | Performed by: UROLOGY

## 2023-10-20 PROCEDURE — 81003 URINALYSIS AUTO W/O SCOPE: CPT | Performed by: UROLOGY

## 2023-10-20 PROCEDURE — 51798 US URINE CAPACITY MEASURE: CPT | Performed by: UROLOGY

## 2023-10-20 PROCEDURE — G8484 FLU IMMUNIZE NO ADMIN: HCPCS | Performed by: UROLOGY

## 2023-10-20 PROCEDURE — 99204 OFFICE O/P NEW MOD 45 MIN: CPT | Performed by: UROLOGY

## 2023-10-20 NOTE — PROGRESS NOTES
[facility]  [address] [address] : 1950     HPI   68 y.o., male returns in follow up for CaP. S/P RALP on 14. Final path was [place] 3+4, T2cNx with neg margins. Reports mild but persistent SURI. Voiding well. Previously taking Cialis 5mg and ADRIEL in combination with partial success for ED. Has not used Cialis recently due to new diagnosis of RA. Reports facial flushing, nasal congestion and headache with 20mg dosage. Has tried Viagra with limited success. Reports difficulty with ejaculation and depressed libido. Last PSA was und on 10/1/20. S/P R IHR by Dr. Faiza Iglesias on 59. Returns to reports increased SURI with urgency. Diagnosed with Whipples disease since last visit but brain lesions and overall cognitive decline that has improved with rehab. Past Medical History:   Diagnosis Date    Cancer [facility])     prostate    Hypercholesteremia     Hypertension     Inguinal hernia 14    RIGHT    Neurological disorder     Whipple's disease    Personal history of prostate cancer     Psychiatric disorder     bipolar/ anxiety     Past Surgical History:   Procedure Laterality Date    BIOPSY PROSTATE      HERNIA REPAIR Left     LIH    [place] APPENDECTOMY      PROSTATECTOMY       Current Outpatient Medications   Medication Sig Dispense Refill    acetaminophen (TYLENOL) 500 MG tablet Take by mouth every 6 hours as needed      amLODIPine (NORVASC) 5 MG tablet Take 1 tablet by mouth      atorvastatin (LIPITOR) 20 MG tablet Take 1 tablet by mouth      cetirizine (ZYRTEC) 10 MG tablet Take 1 tablet by mouth      vitamin D (CHOLECALCIFEROL) 25 MCG (1000 UT) TABS tablet Take by mouth daily      diazePAM (VALIUM) 5 MG tablet Take 1 tablet by mouth every 6 hours as needed.       famotidine (PEPCID) 20 MG tablet Take 1 tablet by mouth 2 times daily      folic acid (FOLVITE) 767 MCG tablet Take 1 tablet by mouth daily      lamoTRIgine (LAMICTAL)

## 2023-11-27 ENCOUNTER — PATIENT MESSAGE (OUTPATIENT)
Dept: UROLOGY | Age: 73
End: 2023-11-27

## 2024-02-16 ENCOUNTER — OFFICE VISIT (OUTPATIENT)
Dept: UROLOGY | Age: 74
End: 2024-02-16

## 2024-02-16 DIAGNOSIS — R32 URINARY INCONTINENCE, UNSPECIFIED TYPE: Primary | ICD-10-CM

## 2024-02-16 DIAGNOSIS — C61 PROSTATE CANCER (HCC): ICD-10-CM

## 2024-02-16 LAB
BILIRUBIN, URINE, POC: NEGATIVE
BLOOD URINE, POC: NEGATIVE
GLUCOSE URINE, POC: NEGATIVE
KETONES, URINE, POC: NEGATIVE
LEUKOCYTE ESTERASE, URINE, POC: NEGATIVE
NITRITE, URINE, POC: NEGATIVE
PH, URINE, POC: 6.5 (ref 4.6–8)
PROTEIN,URINE, POC: NEGATIVE
PVR, POC: 9 CC
SPECIFIC GRAVITY, URINE, POC: 1.03 (ref 1–1.03)
URINALYSIS CLARITY, POC: NORMAL
URINALYSIS COLOR, POC: NORMAL
UROBILINOGEN, POC: NORMAL

## 2024-02-16 NOTE — PROGRESS NOTES
[facility]  [address] [place] [address]  [phone]    Reddy Singleton Jr.  : 1950     HPI   73 y.o., male returns in follow up for CaP.  S/P RALP on 14. Final path was [place] 3+4, T2cNx with neg margins.  Voiding well. Previously taking Cialis 5mg and ADRIEL in combination with partial success for ED in past but no longer using. Last PSA was und on 10/20/23.  S/P R IHR by Dr. Retana on 16.  Returns to reports increased SURI with urgency.  No improvement with Myrbetriq.  Diagnosed with Whipples disease and cont to experience cognitive decline.      Past Medical History:   Diagnosis Date    Cancer (HCC)     prostate    Hypercholesteremia     Hypertension     Inguinal hernia 14    RIGHT    Neurological disorder     Whipple's disease    Personal history of prostate cancer     Psychiatric disorder     bipolar/ anxiety     Past Surgical History:   Procedure Laterality Date    BIOPSY PROSTATE      HERNIA REPAIR Left     LIH    [place] APPENDECTOMY      PROSTATECTOMY       Current Outpatient Medications   Medication Sig Dispense Refill    acetaminophen (TYLENOL) 500 MG tablet Take by mouth every 6 hours as needed      amLODIPine (NORVASC) 5 MG tablet Take 1 tablet by mouth      atorvastatin (LIPITOR) 20 MG tablet Take 1 tablet by mouth      cetirizine (ZYRTEC) 10 MG tablet Take 1 tablet by mouth      vitamin D (CHOLECALCIFEROL) 25 MCG (1000 UT) TABS tablet Take by mouth daily      diazePAM (VALIUM) 5 MG tablet Take 1 tablet by mouth every 6 hours as needed.      famotidine (PEPCID) 20 MG tablet Take 1 tablet by mouth 2 times daily      folic acid (FOLVITE) 400 MCG tablet Take 1 tablet by mouth daily      lamoTRIgine (LAMICTAL) 200 MG tablet Take 2 tablets by mouth      QUEtiapine (SEROQUEL XR) 50 MG extended release tablet Take 1 tablet by mouth      sildenafil (REVATIO) 20 MG tablet Take 1 tablet by mouth 3 times daily      tiZANidine (ZANAFLEX) 4 MG capsule Take 1 capsule by

## 2024-05-01 ENCOUNTER — HOSPITAL ENCOUNTER (OUTPATIENT)
Dept: PHYSICAL THERAPY | Age: 74
Setting detail: RECURRING SERIES
Discharge: HOME | End: 2024-05-04
Payer: MEDICARE

## 2024-05-01 DIAGNOSIS — R27.8 OTHER LACK OF COORDINATION: ICD-10-CM

## 2024-05-01 DIAGNOSIS — R39.89 OTHER SYMPTOMS AND SIGNS INVOLVING THE GENITOURINARY SYSTEM: ICD-10-CM

## 2024-05-01 DIAGNOSIS — N39.3 STRESS INCONTINENCE (FEMALE) (MALE): Primary | ICD-10-CM

## 2024-05-01 PROCEDURE — 97530 THERAPEUTIC ACTIVITIES: CPT

## 2024-05-01 PROCEDURE — 97162 PT EVAL MOD COMPLEX 30 MIN: CPT

## 2024-05-01 ASSESSMENT — PAIN SCALES - GENERAL: PAINLEVEL_OUTOF10: 0

## 2024-05-01 NOTE — PROGRESS NOTES
Reddy Singleton Jr.  : 1950  Primary: Medicare Part A And B (Medicare)  Secondary: ANTHEM MEDICARE SUPP [facility] @ [address]  [facility] [address]  Phone: [phone]  Fax: [phone] Plan Frequency: 1x/week  Plan of Care/Certification Expiration Date: 24        Plan of Care/Certification Expiration Date:  Plan of Care/Certification Expiration Date: 24    Frequency/Duration: Plan Frequency: 1x/week      Time In/Out:   Time In: 1605  Time Out: 1725      PT Visit Info:    Progress Note Due Date: 24  No Show: 0  Canceled Appointment: 0      Visit Count:  1    OUTPATIENT PHYSICAL THERAPY:   Treatment Note 2024       Episode  (PFPT)               Treatment Diagnosis:    Stress incontinence (female) (male)  Other lack of coordination  Other symptoms and signs involving the genitourinary system  Contributing Diagnosis:  Frequency of micturition (R35.0) and Pelvic Muscle Wasting (N81.84)  Medical/Referring Diagnosis:    Unspecified urinary incontinence [R32]    Referring Physician:  Po Mcdermott DO MD Orders:  PT Eval and Treat   Return MD Appt:  10/18/24   Date of Onset:  3 years ago  Allergies:   Aspirin and Sulfa antibiotics  Restrictions/Precautions:   None      Interventions Planned (Treatment may consist of any combination of the following):     See Assessment Note    Subjective Comments:  Stress incontinence primarily with transfers    Initial Pain Level::     0/10  Post Session Pain Level:       0/10  Medications Last Reviewed:  2024  Updated Objective Findings:  See Evaluation Note from today  Treatment     THERAPEUTIC ACTIVITY: ( 20 minutes): Functional activity education regarding anatomy, pathology and role of pelvic floor muscle (PFM) function in relation to presenting symptoms and role of pelvic floor therapy in conservative treatment. and education today per chart below in bold:    TA Educational Topic Notes Date

## 2024-05-01 NOTE — THERAPY EVALUATION
05/01/24  Pain:  0  Urinary symptoms:  4  Quality of life impact:  11 Most Recent: X (Date: -- )       Medical Necessity:   > Patient is expected to demonstrate progress in strength, balance, coordination, and functional technique to increase independence with self-management of symptoms.  Reason For Services/Other Comments:  > Patient continues to require skilled intervention due to above mentioned deficits.    Regarding Reddy Singleton Jr.'s therapy, I certify that the treatment plan above will be carried out by a therapist or under their direction.  Thank you for this referral,  Chiara Valle, PT, DPT  Referring Physician Signature: Po Mcdermott DO _______________________________ Date _____________        Charge Capture  Appt Desk     Future Appointments   Date Time Provider Department Center   5/10/2024 11:00 AM Chiara Valle PT [facility]   5/17/2024  8:00 AM Chiara Valle PT [facility]   5/24/2024  8:00 AM Chiara Valle PT [facility]   5/31/2024  1:00 PM Chiara Valle PT [facility]   10/11/2024  9:00 AM PGU PREM BLOOD DRAWS [facility]   10/18/2024  9:00 AM Po Mcdermott DO [facility]

## 2024-05-10 ENCOUNTER — HOSPITAL ENCOUNTER (OUTPATIENT)
Dept: PHYSICAL THERAPY | Age: 74
Setting detail: RECURRING SERIES
Discharge: HOME | End: 2024-05-13
Payer: MEDICARE

## 2024-05-10 PROCEDURE — 97110 THERAPEUTIC EXERCISES: CPT

## 2024-05-10 PROCEDURE — 97530 THERAPEUTIC ACTIVITIES: CPT

## 2024-05-10 ASSESSMENT — PAIN SCALES - GENERAL: PAINLEVEL_OUTOF10: 0

## 2024-05-10 NOTE — PROGRESS NOTES
Reddy Singleton Jr.  : 1950  Primary: Medicare Part A And B (Medicare)  Secondary: ANTHEM MEDICARE SUPP [facility][note 2] @ [address]  [facility] [address]  Phone: [phone]  Fax: [phone] Plan Frequency: 1x/week  Plan of Care/Certification Expiration Date: 24        Plan of Care/Certification Expiration Date:  Plan of Care/Certification Expiration Date: 24    Frequency/Duration: Plan Frequency: 1x/week      Time In/Out:   Time In: 1102  Time Out: 1228      PT Visit Info:    Progress Note Due Date: 24  No Show: 0  Canceled Appointment: 0      Visit Count:  2    OUTPATIENT PHYSICAL THERAPY:   Treatment Note 5/10/2024       Episode  (PFPT)               Treatment Diagnosis:    Stress incontinence (female) (male)  Other lack of coordination  Other symptoms and signs involving the genitourinary system  Contributing Diagnosis:  Frequency of micturition (R35.0) and Pelvic Muscle Wasting (N81.84)  Medical/Referring Diagnosis:    Unspecified urinary incontinence [R32]    Referring Physician:  Po Mcdermott DO MD Orders:  PT Eval and Treat   Return MD Appt:  10/18/24   Date of Onset:  3 years ago  Allergies:   Aspirin and Sulfa antibiotics  Restrictions/Precautions:   None      Interventions Planned (Treatment may consist of any combination of the following):     See Assessment Note    Subjective Comments:  Stress incontinence primarily with transfers  Has had constipation.  Takes Miralax full-dose but this seems to cause looser stool for the one day, then nothing again for 2-3 days.  Has been doing the breathing exercises and practicing PFM contract on exhale, PFM relax on inhale alternating with each breath.    Initial Pain Level::     0/10  Post Session Pain Level:       0/10  Medications Last Reviewed:  5/10/2024  Updated Objective Findings:   See below    Palpation:  Patient does demonstrate ability to perform pelvic floor contraction on

## 2024-05-17 ENCOUNTER — APPOINTMENT (OUTPATIENT)
Dept: PHYSICAL THERAPY | Age: 74
End: 2024-05-17
Payer: MEDICARE

## 2024-05-23 NOTE — PROGRESS NOTES
Reddy Singleton Jr.  : 1950  Primary: Medicare Part A And B (Medicare)  Secondary: ANTHEM MEDICARE SUPP [facility] @ [address]  [facility] [address]  Phone: [phone]  Fax: [phone] Plan Frequency: 1x/week  Plan of Care/Certification Expiration Date: 24        Plan of Care/Certification Expiration Date:  Plan of Care/Certification Expiration Date: 24    Frequency/Duration: Plan Frequency: 1x/week      Time In/Out:   Time In: 0807  Time Out: 0903      PT Visit Info:    Progress Note Due Date: 24  No Show: 0  Canceled Appointment: 0      Visit Count:  3    OUTPATIENT PHYSICAL THERAPY:   Treatment Note 2024       Episode  (PFPT)               Treatment Diagnosis:    Stress incontinence (female) (male)  Other lack of coordination  Other symptoms and signs involving the genitourinary system  Contributing Diagnosis:  Frequency of micturition (R35.0) and Pelvic Muscle Wasting (N81.84)  Medical/Referring Diagnosis:    Unspecified urinary incontinence [R32]    Referring Physician:  Po Mcdermott DO MD Orders:  PT Eval and Treat   Return MD Appt:  10/18/24   Date of Onset:  3 years ago  Allergies:   Aspirin and Sulfa antibiotics  Restrictions/Precautions:   None      Interventions Planned (Treatment may consist of any combination of the following):     See Assessment Note    Subjective Comments:  Stress incontinence primarily with transfers    Feels generally he has had improvement in most everything.  Has been trying to do the Kegels.  Has not been automatically remembering to perform functional tasks.  Has woken up the last two days with urge to urinate, and that is an improvement.  Struggling with insomnia, medicated for this.    Initial Pain Level::      /10  Post Session Pain Level:        /10  Medications Last Reviewed:  2024  Updated Objective Findings:   See below    Observation:  Forward flexed standing posture.  When

## 2024-05-24 ENCOUNTER — HOSPITAL ENCOUNTER (OUTPATIENT)
Dept: PHYSICAL THERAPY | Age: 74
Setting detail: RECURRING SERIES
Discharge: HOME | End: 2024-05-27
Payer: MEDICARE

## 2024-05-24 PROCEDURE — 97110 THERAPEUTIC EXERCISES: CPT

## 2024-05-31 ENCOUNTER — HOSPITAL ENCOUNTER (OUTPATIENT)
Dept: PHYSICAL THERAPY | Age: 74
Setting detail: RECURRING SERIES
End: 2024-05-31
Payer: MEDICARE

## 2024-05-31 PROCEDURE — 97530 THERAPEUTIC ACTIVITIES: CPT

## 2024-05-31 PROCEDURE — 97110 THERAPEUTIC EXERCISES: CPT

## 2024-05-31 ASSESSMENT — PAIN SCALES - GENERAL: PAINLEVEL_OUTOF10: 0

## 2024-05-31 NOTE — PROGRESS NOTES
Reddy Singleton Jr.  : 1950  Primary: Medicare Part A And B (Medicare)  Secondary: ANTHEM MEDICARE SUPP [facility] @ [address]  [facility] [address]  Phone: [phone]  Fax: [phone] Plan Frequency: 1x/week  Plan of Care/Certification Expiration Date: 24        Plan of Care/Certification Expiration Date:  Plan of Care/Certification Expiration Date: 24    Frequency/Duration: Plan Frequency: 1x/week      Time In/Out:   Time In: 1301  Time Out: 1410      PT Visit Info:    Progress Note Due Date: 24  No Show: 0  Canceled Appointment: 0      Visit Count:  4    OUTPATIENT PHYSICAL THERAPY:   Treatment Note and Progress Report 2024       Episode  (PFPT)               Treatment Diagnosis:    Stress incontinence (female) (male)  Other lack of coordination  Other symptoms and signs involving the genitourinary system  Contributing Diagnosis:  Frequency of micturition (R35.0) and Pelvic Muscle Wasting (N81.84)  Medical/Referring Diagnosis:    Unspecified urinary incontinence [R32]    Referring Physician:  Po Mcdermott DO MD Orders:  PT Eval and Treat   Return MD Appt:  10/18/24   Date of Onset:  3 years ago  Allergies:   Aspirin and Sulfa antibiotics  Restrictions/Precautions:   None      Interventions Planned (Treatment may consist of any combination of the following):     See Assessment Note    Subjective Comments:  Stress incontinence primarily with transfers  Has been doing his HEP every day.  Feels he is more active now, getting into squat position more often, and he has had more frequent leakage with this.  Had episode of incontinence after standing for too long a period of time.    Started taking Trazodone prescribed from Dr. Mabry (neurologist) for his insomnia, but has caused a lot of coordination issues and he fell at the pool this week.  Fell backwards.  Changed medication to Sonota, and that has helped somewhat but still feels

## 2024-06-27 NOTE — PROGRESS NOTES
Reddy Singleton Jr.  : 1950  Primary: Medicare Part A And B (Medicare)  Secondary: ANTHEM MEDICARE SUPP [facility] @ [address]  [facility] [address]  Phone: [phone]  Fax: [phone] Plan Frequency: 1x/week  Plan of Care/Certification Expiration Date: 24        Plan of Care/Certification Expiration Date:  Plan of Care/Certification Expiration Date: 24    Frequency/Duration: Plan Frequency: 1x/week      Time In/Out:   Time In: 0900  Time Out: 1009      PT Visit Info:    Progress Note Due Date: 24  No Show: 0  Canceled Appointment: 0      Visit Count:  5    OUTPATIENT PHYSICAL THERAPY:   Treatment Note and Progress Report 2024       Episode  (PFPT)               Treatment Diagnosis:    Stress incontinence (female) (male)  Other lack of coordination  Other symptoms and signs involving the genitourinary system  Contributing Diagnosis:  Frequency of micturition (R35.0) and Pelvic Muscle Wasting (N81.84)  Medical/Referring Diagnosis:    Unspecified urinary incontinence [R32]    Referring Physician:  Po Mcdermott DO MD Orders:  PT Eval and Treat   Return MD Appt:  10/18/24   Date of Onset:  3 years ago  Allergies:   Aspirin and Sulfa antibiotics  Restrictions/Precautions:   None      Interventions Planned (Treatment may consist of any combination of the following):     See Assessment Note    Subjective Comments:  Stress incontinence primarily with transfers    Was on vacation and had a hard time consistently doing exercise program in hotel rooms and visiting other homes.  Still having increased difficulty with coordination (feet with tripping, hands/arms as well), but no new falls.  Has been more active, so feels he has had more frequent incontinence as a result.  Also lost his cell phone with the timer on it to remind him to toilet.      When distracted sitting at the computer, gets leakage he gets up do some activity or lifts

## 2024-06-28 ENCOUNTER — HOSPITAL ENCOUNTER (OUTPATIENT)
Dept: PHYSICAL THERAPY | Age: 74
Setting detail: RECURRING SERIES
Discharge: HOME | End: 2024-07-01
Payer: MEDICARE

## 2024-06-28 PROCEDURE — 97530 THERAPEUTIC ACTIVITIES: CPT

## 2024-06-28 PROCEDURE — 97112 NEUROMUSCULAR REEDUCATION: CPT

## 2024-06-28 ASSESSMENT — PAIN SCALES - GENERAL: PAINLEVEL_OUTOF10: 0

## 2024-07-05 ENCOUNTER — HOSPITAL ENCOUNTER (OUTPATIENT)
Dept: PHYSICAL THERAPY | Age: 74
Setting detail: RECURRING SERIES
Discharge: HOME | End: 2024-07-08
Payer: MEDICARE

## 2024-07-05 PROCEDURE — 97110 THERAPEUTIC EXERCISES: CPT

## 2024-07-05 PROCEDURE — 97112 NEUROMUSCULAR REEDUCATION: CPT

## 2024-07-05 ASSESSMENT — PAIN SCALES - GENERAL: PAINLEVEL_OUTOF10: 0

## 2024-07-05 NOTE — PROGRESS NOTES
abbreviations: MET - muscle energy technique; SCS- Strain counter strain; CTM-Connective tissue mobilizations; CR- Contract/Relax; SP- Sustained pressure; PIT- Positional inhibition techniques; STM Soft -tissue mobilization; MM- Myofascial mobilization; TrP-Trigger point release; IASTM- Instrument assisted soft tissue mobilizations, TDN-Trigger point dry needling)    Pt gives verbal consent to internal rectal assessment/treatment with chaperon present (wife, Violette, present throughout initial assessment)      Treatment/Session Summary:    Treatment Assessment:   Discontinued bird dogs due to instability today.  Also unable to complete sitting knee extension or marching due to instability.  Initiated other sitting Swiss ball exercises to work on mobility, proprioception, and balance.  Initiated large movements to decrease tendency for shuffling gait.  When performing standing exercise at the counter, he often had a hard time with coordination for a 3-step process on the opposing side (able to complete correctly on the first side, but was unable to complete correctly immediately following on the other side).  This is likely neurological, though it improved with slowing down and allowing for focus on individual steps.  Communication/Consultation:  None today  Equipment provided today:   Handouts given today:   updated [employer] handouts   Recommendations/Intent for next treatment session: Next visit will focus on:  Continue to address balance and posture for improving PFM mobility and function in standing positions  Progress with exercises in sustained positions to address PFM endurance (currently just 5 seconds at times).  Biofeedback as needed.  Update [employer] HEP for appropriate picture of lean forwards    >Total Treatment Billable Duration:  63 minutes   Time In: 0859  Time Out: 1004    Chiara Valle, PT , DPT      Charge Capture  [employer] Portal  Appt Desk     Future Appointments   Date Time Provider Department

## 2024-07-12 ENCOUNTER — HOSPITAL ENCOUNTER (OUTPATIENT)
Dept: PHYSICAL THERAPY | Age: 74
Setting detail: RECURRING SERIES
Discharge: HOME | End: 2024-07-15
Payer: MEDICARE

## 2024-07-12 PROCEDURE — 97112 NEUROMUSCULAR REEDUCATION: CPT

## 2024-07-12 PROCEDURE — 97110 THERAPEUTIC EXERCISES: CPT

## 2024-07-12 ASSESSMENT — PAIN SCALES - GENERAL: PAINLEVEL_OUTOF10: 0

## 2024-07-12 NOTE — PROGRESS NOTES
Reddy Singleton Jr.  : 1950  Primary: Medicare Part A And B (Medicare)  Secondary: ANTHEM MEDICARE SUPP [facility] @ [address]  [facility] [address]  Phone: [phone]  Fax: [phone] Plan Frequency: 1x/week  Plan of Care/Certification Expiration Date: 24        Plan of Care/Certification Expiration Date:  Plan of Care/Certification Expiration Date: 24    Frequency/Duration: Plan Frequency: 1x/week      Time In/Out:   Time In: 09  Time Out: 1030      PT Visit Info:    Progress Note Due Date: 24  No Show: 0  Canceled Appointment: 0      Visit Count:  7    OUTPATIENT PHYSICAL THERAPY:   Treatment Note 2024       Episode  (PFPT)               Treatment Diagnosis:    Stress incontinence (female) (male)  Other lack of coordination  Other symptoms and signs involving the genitourinary system  Contributing Diagnosis:  Frequency of micturition (R35.0) and Pelvic Muscle Wasting (N81.84)  Medical/Referring Diagnosis:    Unspecified urinary incontinence [R32]    Referring Physician:  Po Mcdermott DO MD Orders:  PT Eval and Treat   Return MD Appt:  10/18/24   Date of Onset:  3 years ago  Allergies:   Aspirin and Sulfa antibiotics  Restrictions/Precautions:   None      Interventions Planned (Treatment may consist of any combination of the following):     See Assessment Note    Subjective Comments:  Stress incontinence primarily with transfers    Miralax causing very loose stools by the third BM and then fecal incontinence.  Has been doing the full capful every three days.  Not drinking enough water, drinking 3-4 cups of milk each day.    Initial Pain Level:     0/10  Post Session Pain Level:       0/10  Medications Last Reviewed:  2024  Updated Objective Findings:   See below    Observation:  Forward flexed standing posture.  Decreased step length and decreased hip extension R>L.       SEMG evaluation  Position:  sitting  Resting

## 2024-07-19 ENCOUNTER — HOSPITAL ENCOUNTER (OUTPATIENT)
Dept: PHYSICAL THERAPY | Age: 74
Setting detail: RECURRING SERIES
Discharge: HOME | End: 2024-07-22
Payer: MEDICARE

## 2024-07-19 PROCEDURE — 97112 NEUROMUSCULAR REEDUCATION: CPT

## 2024-07-19 PROCEDURE — 97110 THERAPEUTIC EXERCISES: CPT

## 2024-07-19 PROCEDURE — 97530 THERAPEUTIC ACTIVITIES: CPT

## 2024-07-19 NOTE — PROGRESS NOTES
fully erect posture in kneeling       SEMG evaluation  Position:  hooklying  Resting tone: 1.2-2.2uV  Quality of rest: [] irregular     [] elevated     [x] WNL  Resting tone increased to 3uV average after performing SLR with R leg.  This improved to 1.5-2.0uV when switching to the L leg    Quality of:    Recruitment: [] slow     [] fair     [x] good   Derecruitment: [] slow     [x] fair     [x] good   Stability of hold: [] slow     [] fair     [x] good   Stability of rest: [] slow     [] fair     [x] good   Baseline b/t contract: [] slow     [x] fair     [x] good   Overflow: [] absent     [x] min     [] mod     [] severe - abdominals        Treatment     THERAPEUTIC ACTIVITY: (10 minutes):  education today per chart below in bold:    TA Educational Topic Notes Date Completed   Pathology/Anatomy/PFM Function Relationship between PFM and diaphragm, roles for pelvic stability 05/01/24   Bladder health education Increasing water intake, storage capacity  Increasing water intake on wakening, water intake goals, correlation of dehydration with constipation 05/01/24 07/12/24   Urinary urge suppression     The knack     Voiding strategies Timed toileting on awakening and every two hours starting around 8 AM (wakes most often at 6:30), finishing with one before bedtime.  Setting timer for 2 hours on phone in lieu of reminder 06/28/24 07/19/24   Nocturia tips     Bowel/Bladder log     Bowel health education Increasing water intake, toileting positioning, fiber intake 05/01/24   Constipation care     Diarrhea/Fecal leakage     Colonic massage Performed and instructed for home 05/10/24   Toilet positioning Feet on stool, elbows on knees, effect on anorectal angle 05/10/24   Defecation dynamics Instruction on audible exhale, \"fogging a mirror\" (instruction only) 05/10/24   Sources of fiber     Return to intercourse/vaginal trainers/wand     Perineal massage     Sexual positioning     Lubricants/vaginal moisturizers

## 2024-07-26 ENCOUNTER — HOSPITAL ENCOUNTER (OUTPATIENT)
Dept: PHYSICAL THERAPY | Age: 74
Setting detail: RECURRING SERIES
Discharge: HOME | End: 2024-07-29
Payer: MEDICARE

## 2024-07-26 PROCEDURE — 97530 THERAPEUTIC ACTIVITIES: CPT

## 2024-07-26 PROCEDURE — 97110 THERAPEUTIC EXERCISES: CPT

## 2024-07-26 NOTE — PROGRESS NOTES
Reddy Singleton Jr.  : 1950  Primary: Medicare Part A And B (Medicare)  Secondary: ANTHEM MEDICARE SUPP [facility] @ [address]  [facility] [address]  Phone: [phone]  Fax: [phone] Plan Frequency: 1x/week  Plan of Care/Certification Expiration Date: 10/25/24        Plan of Care/Certification Expiration Date:  Plan of Care/Certification Expiration Date: 10/25/24    Frequency/Duration: Plan Frequency: 1x/week      Time In/Out:   Time In: 1355  Time Out: 1405      PT Visit Info:    Progress Note Due Date: 24  No Show: 0  Canceled Appointment: 0      Visit Count:  9    OUTPATIENT PHYSICAL THERAPY:   Treatment Note 2024       Episode  (PFPT)               Treatment Diagnosis:    Stress incontinence (female) (male)  Other lack of coordination  Other symptoms and signs involving the genitourinary system  Contributing Diagnosis:  Frequency of micturition (R35.0) and Pelvic Muscle Wasting (N81.84)  Medical/Referring Diagnosis:    Unspecified urinary incontinence [R32]    Referring Physician:  Po Mcdermott DO MD Orders:  PT Eval and Treat   Return MD Appt:  10/18/24   Date of Onset:  3 years ago  Allergies:   Aspirin and Sulfa antibiotics  Restrictions/Precautions:   None      Interventions Planned (Treatment may consist of any combination of the following):     See Assessment Note    Subjective Comments:  Stress incontinence primarily with transfers    See same date re-certification note.    Initial Pain Level:     0/10  Post Session Pain Level:       0/10  Medications Last Reviewed:  2024  Updated Objective Findings:  See Recertification Note from today      Treatment     THERAPEUTIC ACTIVITY: (10 minutes):  education today per chart below in bold:    TA Educational Topic Notes Date Completed   Pathology/Anatomy/PFM Function Relationship between PFM and diaphragm, roles for pelvic stability 24   Bladder health education Increasing

## 2024-07-26 NOTE — THERAPY RECERTIFICATION
after leakage. 07/26/24   Voiding strategies Timed toileting on awakening and every two hours starting around 8 AM (wakes most often at 6:30), finishing with one before bedtime.  Setting timer for 2 hours on phone in lieu of reminder 06/28/24 07/19/24   Bowel health education Increasing water intake, toileting positioning, fiber intake 05/01/24   Colonic massage Performed and instructed for home 05/10/24   Toilet positioning Feet on stool, elbows on knees, effect on anorectal angle 05/10/24   Defecation dynamics Instruction on audible exhale, \"fogging a mirror\" (instruction only) 05/10/24   Body mechanics Performing PFM contraction on exhale prior to transfers.  Transfer instruction and practice on getting up from floor  Exhaling and performing a Kegel when moving from supine to sit, and when moving from sit to stand to minimize leakage 05/10/24     07/12/24     07/19/24   Posture/ergonomics Effects of forward trunk posture;  Sternal lift, sitting positioning in chair and in car 05/01/24;  05/10/24   Diaphragmatic breathing Sidelying, supine 05/31/24   Other patient education Tactile and verbal cues for PFM contraction, Kegel program, discussion of which exercises to focus on during road portion of trip 05/31/       Therapy Problem List: (Impacting functional limitations):    Decreased Strength, Decreased ROM, Decreased Transfer Abilities, Decreased Ambulation Ability/Technique, Decreased Functional Mobility, Decreased [place] with Home Exercise Program, Decreased Posture, Decreased Balance, Decreased Body Mechanics, Decreased Activity Tolerance/Endurance*, Decreased Tolerance to Work Activity, Decreased Coordination, Decreased High-Level IADLs, Decreased ADL Status, and Decreased Skin Integrity/Hygeine   Therapy Prognosis:   Good     Initial Assessment Complexity:   Moderate Complexity     PLAN   Effective Dates: 07/26/24 TO Plan of Care/Certification Expiration Date: 10/25/24     Frequency/Duration:

## 2024-07-27 ASSESSMENT — PAIN SCALES - GENERAL: PAINLEVEL_OUTOF10: 0

## 2024-08-01 NOTE — PROGRESS NOTES
Reddy Singleton Jr.  : 1950  Primary: Medicare Part A And B (Medicare)  Secondary: ANTHEM MEDICARE SUPP [facility] @ [address]  [facility] [address]  Phone: [phone]  Fax: [phone] Plan Frequency: 1x/week  Plan of Care/Certification Expiration Date: 10/25/24        Plan of Care/Certification Expiration Date:  Plan of Care/Certification Expiration Date: 10/25/24    Frequency/Duration: Plan Frequency: 1x/week      Time In/Out:   Time In: 0858  Time Out: 1002      PT Visit Info:    Progress Note Due Date: 24  No Show: 0  Canceled Appointment: 0      Visit Count:  10    OUTPATIENT PHYSICAL THERAPY:   Treatment Note 2024       Episode  (PFPT)               Treatment Diagnosis:    Stress incontinence (female) (male)  Other lack of coordination  Other symptoms and signs involving the genitourinary system  Contributing Diagnosis:  Frequency of micturition (R35.0) and Pelvic Muscle Wasting (N81.84)  Medical/Referring Diagnosis:    Unspecified urinary incontinence [R32]    Referring Physician:  Po Mcdermott DO MD Orders:  PT Eval and Treat   Return MD Appt:  10/18/24   Date of Onset:  3 years ago  Allergies:   Aspirin and Sulfa antibiotics  Restrictions/Precautions:   None      Interventions Planned (Treatment may consist of any combination of the following):     See Assessment Note    Subjective Comments:  Stress incontinence primarily with transfers    Hasn't been getting walks in every morning, more instability with walking, Dr. Mabry (neurologist) ordered another MRI for his brain on 24.  No recent falls while hiking, but yesterday at home, trying to get up from a concrete curb and had a hard time getting up.  Went to adaptive yoga class again, felt like his leakage has been worse since then.    Initial Pain Level:     0/10  Post Session Pain Level:       0/10  Medications Last Reviewed:  2024  Updated Objective Findings:

## 2024-08-02 ENCOUNTER — HOSPITAL ENCOUNTER (OUTPATIENT)
Dept: PHYSICAL THERAPY | Age: 74
Setting detail: RECURRING SERIES
Discharge: HOME | End: 2024-08-05
Payer: MEDICARE

## 2024-08-02 PROCEDURE — 97110 THERAPEUTIC EXERCISES: CPT

## 2024-08-02 ASSESSMENT — PAIN SCALES - GENERAL: PAINLEVEL_OUTOF10: 0

## 2024-08-08 NOTE — PROGRESS NOTES
Reddy Singleton Jr.  : 1950  Primary: Medicare Part A And B (Medicare)  Secondary: ANTHEM MEDICARE SUPP [facility] @ [address]  [facility] [address]  Phone: [phone]  Fax: [phone] Plan Frequency: 1x/week  Plan of Care/Certification Expiration Date: 10/25/24        Plan of Care/Certification Expiration Date:  Plan of Care/Certification Expiration Date: 10/25/24    Frequency/Duration: Plan Frequency: 1x/week      Time In/Out:   Time In: 0903  Time Out: 1003      PT Visit Info:    Progress Note Due Date: 24  No Show: 0  Canceled Appointment: 0      Visit Count:  11    OUTPATIENT PHYSICAL THERAPY:   Treatment Note 2024       Episode  (PFPT)               Treatment Diagnosis:    Stress incontinence (female) (male)  Other lack of coordination  Other symptoms and signs involving the genitourinary system  Contributing Diagnosis:  Frequency of micturition (R35.0) and Pelvic Muscle Wasting (N81.84)  Medical/Referring Diagnosis:    Unspecified urinary incontinence [R32]    Referring Physician:  Po Mcdermott DO MD Orders:  PT Eval and Treat   Return MD Appt:  10/18/24   Date of Onset:  3 years ago  Allergies:   Aspirin and Sulfa antibiotics  Restrictions/Precautions:   None      Interventions Planned (Treatment may consist of any combination of the following):     See Assessment Note    Subjective Comments:  Stress incontinence primarily with transfers    Has been doing Kegel program more often throughout the day.      Initial Pain Level:     0/10  Post Session Pain Level:       0/10  Medications Last Reviewed:  2024  Updated Objective Findings:         Observation: Difficulty with coordination of wide step and placement of foot, more often when stepping to right (appears to be more with motor planning impairment).      Weakness noted in L hip with increased effort to raise to bar.      Treatment     THERAPEUTIC ACTIVITY: (0 minutes):

## 2024-08-09 ENCOUNTER — HOSPITAL ENCOUNTER (OUTPATIENT)
Dept: PHYSICAL THERAPY | Age: 74
Setting detail: RECURRING SERIES
Discharge: HOME | End: 2024-08-12
Payer: MEDICARE

## 2024-08-09 PROCEDURE — 97110 THERAPEUTIC EXERCISES: CPT

## 2024-08-09 ASSESSMENT — PAIN SCALES - GENERAL: PAINLEVEL_OUTOF10: 0

## 2024-08-22 NOTE — PROGRESS NOTES
Reddy Singleton Jr.  : 1950  Primary: Medicare Part A And B (Medicare)  Secondary: ANTHEM MEDICARE SUPP [facility] @ [address]  [facility] [address]  Phone: [phone]  Fax: [phone] Plan Frequency: 1x/week  Plan of Care/Certification Expiration Date: 10/25/24        Plan of Care/Certification Expiration Date:  Plan of Care/Certification Expiration Date: 10/25/24    Frequency/Duration: Plan Frequency: 1x/week      Time In/Out:   Time In: 09  Time Out: 1015      PT Visit Info:    Progress Note Due Date: 24  No Show: 0  Canceled Appointment: 0      Visit Count:  12    OUTPATIENT PHYSICAL THERAPY:   Treatment Note and Progress Report 2024       Episode  (PFPT)               Treatment Diagnosis:    Stress incontinence (female) (male)  Other lack of coordination  Other symptoms and signs involving the genitourinary system  Contributing Diagnosis:  Frequency of micturition (R35.0) and Pelvic Muscle Wasting (N81.84)  Medical/Referring Diagnosis:    Unspecified urinary incontinence [R32]    Referring Physician:  Po Mcdermott DO MD Orders:  PT Eval and Treat   Return MD Appt:  10/18/24   Date of Onset:  3 years ago  Allergies:   Aspirin and Sulfa antibiotics  Restrictions/Precautions:   None      Interventions Planned (Treatment may consist of any combination of the following):     See Assessment Note    Subjective Comments:  Stress incontinence primarily with transfers    Only woke up once last night to use bathroom, wasn't until 4 AM.  Taking sleep aid on empty stomach helps.      Frequency of urination during daytime has been increased in last week, sometimes has to wear a full diaper because of the amount of leakage.  Does not appreciate sensations of urgency, however, notices the leakage.  Sometimes wakes up wet at night, however, most often wakes up dry.    Does not believe he has seen improvement in leakage frequency, though has been  [email or web address]  [email or web address]  No changes.  Reviewed HEP and answered patient's questions. Tandem stance with hand hold on counter with feeling for equal weight bearing on legs. Medbridge program updated Medbridge program updated   LE strengthening - Chair squats with Kegel x10, emphasis on exhale and slow lift. - 2nd position bridge 10s hold x5  - Squat to low stool x10 - 2nd position bridge 10s hold x5    Pelvic mobility and coordination    - Cat-cow look down on exhale x10  - Pelvic rocking in supine post tilt on exhale x10  - Pelvic rocking laterally in supine 3s holds x10  - Sidelying open book x5 B  - Supine hamstring curls with ball on exhale x10  - Lateral feet shifts with swiss ball from 10 to 2 o'clock x10  - Lower trunk rotation x10   Lumbopelvic strengthening  - Dead bugs x5 B  - Clam 10s hold x5 B  - Alligator 10s hold x5 B  - Dynamic hip abduction, extension, neutral, & slow lower x5 B - Dead bugs x7 B  - Clam 10s hold x5 B  - Alligator 10s hold x5 B  - Dynamic hip abduction, extension, neutral, & slow lower  - Squat to low stool 2x5    Stretching  - Figure 4 B  - Hamstring B  - Happy baby  - Figure 4 with hamstring B   Balance and PFM stability - Tandem walking 4x5'  - Lateral stepping 2x20'  - step-over 4\" claire x3 min  - Step-over ankle weight x3 min  - step-up onto balance pad with 1-legged balance x5 min  - Hip abduction on balance pad 2x5 B - Single leg stance on balance pad with head turns horizontally 15\" x3 each  - Step-over 3\" claire laterally x2'  - Tandem walking x5' - Tandem balance fwd/bkwd 4x5'  - Lateral stepping over floor obstacle x3'  - Lateral leg lifts and placement of toe x3'  - Single leg stance on balance pad with cross body reaches  - High marching to bar         NEUROMUSCULAR RE-EDUCATION: (0 minutes):    Exercise/activities per grid below to improve coordination and proprioception.  Required moderate visual cues to promote coordination of pelvic floor in

## 2024-08-23 ENCOUNTER — HOSPITAL ENCOUNTER (OUTPATIENT)
Dept: PHYSICAL THERAPY | Age: 74
Setting detail: RECURRING SERIES
Discharge: HOME | End: 2024-08-26
Payer: MEDICARE

## 2024-08-23 PROCEDURE — 97530 THERAPEUTIC ACTIVITIES: CPT

## 2024-08-23 PROCEDURE — 97110 THERAPEUTIC EXERCISES: CPT

## 2024-08-23 ASSESSMENT — PAIN SCALES - GENERAL: PAINLEVEL_OUTOF10: 0

## 2024-09-06 ENCOUNTER — HOSPITAL ENCOUNTER (OUTPATIENT)
Dept: PHYSICAL THERAPY | Age: 74
Setting detail: RECURRING SERIES
Discharge: HOME | End: 2024-09-09
Payer: MEDICARE

## 2024-09-06 PROCEDURE — 97110 THERAPEUTIC EXERCISES: CPT

## 2024-09-06 PROCEDURE — 97530 THERAPEUTIC ACTIVITIES: CPT

## 2024-09-06 ASSESSMENT — PAIN SCALES - GENERAL
PAINLEVEL_OUTOF10: 0
PAINLEVEL_OUTOF10: 0

## 2024-09-13 ENCOUNTER — HOSPITAL ENCOUNTER (OUTPATIENT)
Dept: PHYSICAL THERAPY | Age: 74
Setting detail: RECURRING SERIES
Discharge: HOME | End: 2024-09-16
Payer: MEDICARE

## 2024-09-13 PROCEDURE — 97110 THERAPEUTIC EXERCISES: CPT

## 2024-09-13 PROCEDURE — 97530 THERAPEUTIC ACTIVITIES: CPT

## 2024-09-13 ASSESSMENT — PAIN SCALES - GENERAL: PAINLEVEL_OUTOF10: 0

## 2024-09-20 ENCOUNTER — HOSPITAL ENCOUNTER (OUTPATIENT)
Dept: PHYSICAL THERAPY | Age: 74
Setting detail: RECURRING SERIES
Discharge: HOME | End: 2024-09-23
Payer: MEDICARE

## 2024-09-20 PROCEDURE — 97110 THERAPEUTIC EXERCISES: CPT

## 2024-09-20 PROCEDURE — 97530 THERAPEUTIC ACTIVITIES: CPT

## 2024-09-20 ASSESSMENT — PAIN SCALES - GENERAL: PAINLEVEL_OUTOF10: 0

## 2024-09-25 ENCOUNTER — APPOINTMENT (OUTPATIENT)
Dept: PHYSICAL THERAPY | Age: 74
End: 2024-09-25
Payer: MEDICARE

## 2024-10-02 ASSESSMENT — PAIN SCALES - GENERAL: PAINLEVEL_OUTOF10: 0

## 2024-10-02 NOTE — PROGRESS NOTES
remembering to do Kegel prior to transfers.    Initial Pain Level:     0/10  Post Session Pain Level:       0/10  Medications Last Reviewed:  10/4/2024  Updated Objective Findings:        Observation:  requires moderate cues for upright posture after squats.    Standby assistance for balance activities.      Treatment     THERAPEUTIC ACTIVITY: (0 minutes):      Education done today per chart below in bold.    TA Educational Topic Notes Date Completed   Pathology/Anatomy/PFM Function Relationship between PFM and diaphragm, roles for pelvic stability;  Role of neurological system with continence, nocturnal enuresis 05/01/24 09/13/24   Bladder health education Increasing water intake, storage capacity  Increasing water intake on wakening, water intake goals, correlation of dehydration with constipation 05/01/24 07/12/24   Urinary urge suppression Stop, breathe, quick flicks, distract.  Normal urinary frequency, goals with timed toileting, setting alarms (not reminder, which only rings once). 08/23/24   [facility] trials after leakage. 07/26/24   Voiding strategies Timed toileting on awakening and every two hours starting around 8 AM (wakes most often at 6:30), finishing with one before bedtime.  Setting timer for 2 hours on phone in lieu of reminder.  Shake downward prior to getting up from toilet to minimize leakage upon standing.  Setting timer for 1.5 hours and new alarm.    \"Dribble Stop\" clamp as consideration for helping minimize leakage, timer at 1.5 hours 06/28/24 07/19/24 08/23/24 09/06/24 09/13/24   Nocturia tips     Bowel/Bladder log     Bowel health education Increasing water intake, toileting positioning, fiber intake 05/01/24   Constipation care     Diarrhea/Fecal leakage     Colonic massage Performed and instructed for home 05/10/24   Toilet positioning Feet on stool, elbows on knees, effect on anorectal angle 05/10/24   Defecation dynamics Instruction on audible exhale,

## 2024-10-04 ENCOUNTER — HOSPITAL ENCOUNTER (OUTPATIENT)
Dept: PHYSICAL THERAPY | Age: 74
Setting detail: RECURRING SERIES
Discharge: HOME | End: 2024-10-07
Payer: MEDICARE

## 2024-10-04 PROCEDURE — 97110 THERAPEUTIC EXERCISES: CPT

## 2024-10-11 ENCOUNTER — LAB (OUTPATIENT)
Dept: UROLOGY | Age: 74
End: 2024-10-11

## 2024-10-11 ENCOUNTER — HOSPITAL ENCOUNTER (OUTPATIENT)
Dept: PHYSICAL THERAPY | Age: 74
Setting detail: RECURRING SERIES
Discharge: HOME | End: 2024-10-14
Payer: MEDICARE

## 2024-10-11 DIAGNOSIS — C61 PROSTATE CANCER (HCC): ICD-10-CM

## 2024-10-11 PROCEDURE — 97110 THERAPEUTIC EXERCISES: CPT

## 2024-10-11 ASSESSMENT — PAIN SCALES - GENERAL: PAINLEVEL_OUTOF10: 0

## 2024-10-11 NOTE — PROGRESS NOTES
Date:  07/12/24 Date:  07/19/24   Activity/Exercise Parameters Parameters Parameters Parameters   sEMG biofeedback protocol 30 minutes      Elevator - graded contractions x5  x5    Quick flicks 2x10  3x10    Toe squeeze (proprioception only, no volitional PFM contraction) x5  x5    Coordination of contraction with movement - Squats  - Pallof press 5# x10  - Adduction with Kegel in sitting 5s holds x5    Biofeedback to assess for proper PFM drops between repetitions  10 minutes 5 minutes 10 minutes   Focus on large movement  \"Lift, place, lift, return\" with toe during hip flexion in standing at counter \"Lift, place, lift, return\" with toe during hip flexion standing at counter         MANUAL THERAPY: (0 minutes):     Date Type Location Comments   05/01/24 Internal assessment Via rectal canal See evaluation   07/26/24 Internal assessment  Via rectal canal See re-certification note                                 (Used abbreviations: MET - muscle energy technique; SCS- Strain counter strain; CTM-Connective tissue mobilizations; CR- Contract/Relax; SP- Sustained pressure; PIT- Positional inhibition techniques; STM Soft -tissue mobilization; MM- Myofascial mobilization; TrP-Trigger point release; IASTM- Instrument assisted soft tissue mobilizations, TDN-Trigger point dry needling)    Pt gives verbal consent to internal rectal assessment/treatment with chaperon present.  Patient's wife, Violette, was present throughout treatment session.      Treatment/Session Summary:    Treatment Assessment:   Standby assistance for balance activities.  Requires moderate verbal cues for ensuring he shifts weight onto leg to encourage stability prior to lifting and moving the other leg.  Communication/Consultation:  None today  Equipment provided today:   Handouts given today:   none.  Recommendations/Intent for next treatment session: Next visit will focus on:  Re-certification note next session        >Total Treatment Billable Duration:

## 2024-10-12 LAB — PSA SERPL DL<=0.01 NG/ML-MCNC: <0.006 NG/ML (ref 0–4)

## 2024-10-16 NOTE — PROGRESS NOTES
holds x5  - Hip drives in kneeling with lat pull Blue 5s holds x10    Balance and PFM stability - Anterior lunge with focus on equal weight bearing and balance 2x5 B  - Posterior lunge with focus on equal weight bearing and balance x5 B  - Dynamic standing hip abduction x30\" B  - Dynamic standing hip extension x30\" B  - Large leg march with placement to side over obstacle x5 B  - Ball tosses:  - Forward double stance x90\"  - Forward tandem stance 2x60\"  - Side staggered stance 2x60\" - Anterior lunge with focus on equal weight bearing and balance x7 B  - Posterior lunge with focus on equal weight bearing and balance x5 B  - Dynamic standing hip abduction x45\" B  - Dynamic standing hip extension x30\" B  - Large leg march with placement to side over obstacle 2x5 B  - Ball tosses with 6# ball:  - Forward double stance x90\"  - Forward tandem stance x60\"  - Side staggered stance 2x60\" - Tandem walking forward/bkwd eyes open x5'  - Tandem stance eyes closed with and without dynamic perturbations 2x60'  - Alternating lunge onto 2nd step with pallof press and overhead press 6# x5 B  Ball tosses with 6# ball:  - Normal stance  - Narrow stance         NEUROMUSCULAR RE-EDUCATION: (0 minutes):  not today  Exercise/activities per grid below to improve coordination and proprioception.  Required moderate visual cues to promote coordination of pelvic floor in multiple positions and with appropriate form  .   Date:  06/28/24 Date:  07/05/24 Date:  07/12/24 Date:  07/19/24   Activity/Exercise Parameters Parameters Parameters Parameters   sEMG biofeedback protocol 30 minutes      Elevator - graded contractions x5  x5    Quick flicks 2x10  3x10    Toe squeeze (proprioception only, no volitional PFM contraction) x5  x5    Coordination of contraction with movement - Squats  - Pallof press 5# x10  - Adduction with Kegel in sitting 5s holds x5    Biofeedback to assess for proper PFM drops between repetitions  10 minutes 5 minutes 10 minutes

## 2024-10-16 NOTE — THERAPY RECERTIFICATION
forward trunk posture;  Sternal lift, sitting positioning in chair and in car 05/01/24;  05/10/24   Diaphragmatic breathing Sidelying, supine  Supine, sidelying, sitting 05/31/24;  08/23/24   Other patient education Tactile and verbal cues for PFM contraction, Kegel program, discussion of which exercises to focus on during road portion of trip;  Ways to maximize safety at night, snack and book at bedside  Sleep hygiene 05/31/24 09/06/24 09/20/24       Therapy Problem List: (Impacting functional limitations):    Decreased Strength, Decreased ROM, Decreased Transfer Abilities, Decreased Ambulation Ability/Technique, Decreased Functional Mobility, Decreased [place] with Home Exercise Program, Decreased Posture, Decreased Balance, Decreased Body Mechanics, Decreased Activity Tolerance/Endurance*, Decreased Tolerance to Work Activity, Decreased Coordination, Decreased High-Level IADLs, Decreased ADL Status, and Decreased Skin Integrity/Hygeine   Therapy Prognosis:   Good     Initial Assessment Complexity:   Moderate Complexity     PLAN   Effective Dates: 10/17/24 TO Plan of Care/Certification Expiration Date: 01/15/25     Frequency/Duration: Plan Frequency: 1x/week     Interventions Planned (Treatment may consist of any combination of the following):    Balance Training, Functional Mobility Training, Gait Training, Home Exercise Program (HEP), Manual Therapy, Neuromuscular Re-education/Strengthening, Therapeutic Activites, Therapeutic Exercise/Strengthening, Patient/Caregiver Education & Training, Safety Education and Training, ADL/Self-Care Training, and IADL Training     Goals: (Goals have been discussed and agreed upon with patient.)  Short-Term Functional Goals: Time Frame: 4 weeks  Patient will demonstrate I with basic PFM HEP to improve awareness, coordination, and timing of PFM.  GOAL MET  Patient will verbalize an understanding of pelvic anatomy and causes/contributors of incontinence.  GOAL

## 2024-10-17 ENCOUNTER — HOSPITAL ENCOUNTER (OUTPATIENT)
Dept: PHYSICAL THERAPY | Age: 74
Setting detail: RECURRING SERIES
Discharge: HOME | End: 2024-10-20
Payer: MEDICARE

## 2024-10-17 PROCEDURE — 97530 THERAPEUTIC ACTIVITIES: CPT

## 2024-10-17 PROCEDURE — 97140 MANUAL THERAPY 1/> REGIONS: CPT

## 2024-10-17 ASSESSMENT — PAIN SCALES - GENERAL: PAINLEVEL_OUTOF10: 0

## 2024-10-18 ENCOUNTER — OFFICE VISIT (OUTPATIENT)
Dept: UROLOGY | Age: 74
End: 2024-10-18
Payer: MEDICARE

## 2024-10-18 DIAGNOSIS — C61 PROSTATE CANCER (HCC): Primary | ICD-10-CM

## 2024-10-18 LAB
BILIRUBIN, URINE, POC: NEGATIVE
BLOOD URINE, POC: NEGATIVE
GLUCOSE URINE, POC: NEGATIVE MG/DL
KETONES, URINE, POC: NEGATIVE MG/DL
LEUKOCYTE ESTERASE, URINE, POC: NEGATIVE
NITRITE, URINE, POC: NEGATIVE
PH, URINE, POC: 6 (ref 4.6–8)
PROTEIN,URINE, POC: NEGATIVE MG/DL
SPECIFIC GRAVITY, URINE, POC: 1.02 (ref 1–1.03)
URINALYSIS CLARITY, POC: NORMAL
URINALYSIS COLOR, POC: NORMAL
UROBILINOGEN, POC: NORMAL MG/DL

## 2024-10-18 PROCEDURE — G8421 BMI NOT CALCULATED: HCPCS | Performed by: UROLOGY

## 2024-10-18 PROCEDURE — G8484 FLU IMMUNIZE NO ADMIN: HCPCS | Performed by: UROLOGY

## 2024-10-18 PROCEDURE — 3017F COLORECTAL CA SCREEN DOC REV: CPT | Performed by: UROLOGY

## 2024-10-18 PROCEDURE — 1036F TOBACCO NON-USER: CPT | Performed by: UROLOGY

## 2024-10-18 PROCEDURE — 1123F ACP DISCUSS/DSCN MKR DOCD: CPT | Performed by: UROLOGY

## 2024-10-18 PROCEDURE — G8427 DOCREV CUR MEDS BY ELIG CLIN: HCPCS | Performed by: UROLOGY

## 2024-10-18 PROCEDURE — 99214 OFFICE O/P EST MOD 30 MIN: CPT | Performed by: UROLOGY

## 2024-10-18 PROCEDURE — 81003 URINALYSIS AUTO W/O SCOPE: CPT | Performed by: UROLOGY

## 2024-10-18 NOTE — PROGRESS NOTES
[facility]  [address] [place] [address]  [phone]    Reddy Singleton Jr.  : 1950     HPI   74 y.o., male returns in follow up for CaP.  S/P RALP on 14. Final path was [place] 3+4, T2cNx with neg margins.  Voiding well. Previously taking Cialis 5mg and ADRIEL in combination with partial success for ED in past but no longer using. Last PSA was und on 10/11/24.  S/P R IHR by Dr. Retana on 16.  PT helping his SURI and urgency symptoms.  Reports 4ppd SURI.  No improvement with Myrbetriq.  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      Past Medical History:   Diagnosis Date    Cancer (HCC)     prostate    Hypercholesteremia     Hypertension     Inguinal hernia 14    RIGHT    Neurological disorder     Whipple's disease    Personal history of prostate cancer     Psychiatric disorder     bipolar/ anxiety     Past Surgical History:   Procedure Laterality Date    BIOPSY PROSTATE      HERNIA REPAIR Left     LIH    MN APPENDECTOMY      PROSTATECTOMY       Current Outpatient Medications   Medication Sig Dispense Refill    acetaminophen (TYLENOL) 500 MG tablet Take by mouth every 6 hours as needed      amLODIPine (NORVASC) 5 MG tablet Take 1 tablet by mouth      atorvastatin (LIPITOR) 20 MG tablet Take 1 tablet by mouth      cetirizine (ZYRTEC) 10 MG tablet Take 1 tablet by mouth      vitamin D (CHOLECALCIFEROL) 25 MCG (1000 UT) TABS tablet Take by mouth daily      famotidine (PEPCID) 20 MG tablet Take 1 tablet by mouth 2 times daily      lamoTRIgine (LAMICTAL) 200 MG tablet Take 2 tablets by mouth      diazePAM (VALIUM) 5 MG tablet Take 1 tablet by mouth every 6 hours as needed.      folic acid (FOLVITE) 400 MCG tablet Take 1 tablet by mouth daily      QUEtiapine (SEROQUEL XR) 50 MG extended release tablet Take 1 tablet by mouth      sildenafil (REVATIO) 20 MG tablet Take 1 tablet by mouth 3 times daily      tiZANidine (ZANAFLEX) 4 MG capsule Take 1 capsule by mouth 3 times daily

## 2024-10-24 NOTE — PROGRESS NOTES
Reddy Singleton Jr.  : 1950  Primary: Medicare Part A And B (Medicare)  Secondary: ANTHEM MEDICARE SUPP [facility] @ [address]  [facility] [address]  Phone: [phone]  Fax: [phone] Plan Frequency: 1x/week  Plan of Care/Certification Expiration Date: 01/15/25        Plan of Care/Certification Expiration Date:  Plan of Care/Certification Expiration Date: 01/15/25    Frequency/Duration: Plan Frequency: 1x/week      Time In/Out:   Time In: 1000  Time Out: 1104      PT Visit Info:     Progress Note Due Date: 24  No Show: 0  Canceled Appointment: 0      Visit Count:  19    OUTPATIENT PHYSICAL THERAPY:   Treatment Note 10/25/2024       Episode  (PFPT)               Treatment Diagnosis:    Stress incontinence (female) (male)  Other lack of coordination  Other symptoms and signs involving the genitourinary system  Contributing Diagnosis:  Frequency of micturition (R35.0) and Pelvic Muscle Wasting (N81.84)  Medical/Referring Diagnosis:    Unspecified urinary incontinence [R32]    Referring Physician:  Po Mcdermott DO MD Orders:  PT Eval and Treat   Return MD Appt:  10/18/24   Date of Onset:  3 years ago  Allergies:   Aspirin and Sulfa antibiotics  Restrictions/Precautions:   None      Interventions Planned (Treatment may consist of any combination of the following):     See Assessment Note    Subjective Comments:  Stress incontinence primarily with transfers    Weak getting out of a sling chair yesterday, chair had no arms and he had been sitting in the cold for a little while, had cell phone in one hand, and he lost his balance and fell backward into his wife's lap.    Works on home program regularly and works up a sweat.    No change in bowel movements. Using Miralax.    Initial Pain Level:     0/10  Post Session Pain Level:       0/10  Medications Last Reviewed:  10/25/2024  Updated Objective Findings:        SEMG evaluation  Position:  sitting

## 2024-10-25 ENCOUNTER — HOSPITAL ENCOUNTER (OUTPATIENT)
Dept: PHYSICAL THERAPY | Age: 74
Setting detail: RECURRING SERIES
Discharge: HOME | End: 2024-10-28
Payer: MEDICARE

## 2024-10-25 PROCEDURE — 97530 THERAPEUTIC ACTIVITIES: CPT

## 2024-10-25 PROCEDURE — 97110 THERAPEUTIC EXERCISES: CPT

## 2024-10-25 PROCEDURE — 97112 NEUROMUSCULAR REEDUCATION: CPT

## 2024-10-25 ASSESSMENT — PAIN SCALES - GENERAL: PAINLEVEL_OUTOF10: 0

## 2024-11-01 ENCOUNTER — HOSPITAL ENCOUNTER (OUTPATIENT)
Dept: PHYSICAL THERAPY | Age: 74
Setting detail: RECURRING SERIES
Discharge: HOME | End: 2024-11-04
Payer: MEDICARE

## 2024-11-01 PROCEDURE — 97110 THERAPEUTIC EXERCISES: CPT

## 2024-11-01 PROCEDURE — 97530 THERAPEUTIC ACTIVITIES: CPT

## 2024-11-01 ASSESSMENT — PAIN SCALES - GENERAL: PAINLEVEL_OUTOF10: 0

## 2024-11-01 NOTE — PROGRESS NOTES
Reddy Singleton Jr.  : 1950  Primary: Medicare Part A And B (Medicare)  Secondary: ANTHEM MEDICARE SUPP [facility] @ [address]  [facility] [address]  Phone: [phone]  Fax: [phone] Plan Frequency: 1x/week  Plan of Care/Certification Expiration Date: 01/15/25        Plan of Care/Certification Expiration Date:  Plan of Care/Certification Expiration Date: 01/15/25    Frequency/Duration: Plan Frequency: 1x/week      Time In/Out:   Time In: 1401  Time Out: 1501      PT Visit Info:     Progress Note Due Date: 24  No Show: 0  Canceled Appointment: 0      Visit Count:  20    OUTPATIENT PHYSICAL THERAPY:   Treatment Note 2024       Episode  (PFPT)               Treatment Diagnosis:    Stress incontinence (female) (male)  Other lack of coordination  Other symptoms and signs involving the genitourinary system  Contributing Diagnosis:  Frequency of micturition (R35.0) and Pelvic Muscle Wasting (N81.84)  Medical/Referring Diagnosis:    Unspecified urinary incontinence [R32]    Referring Physician:  Po Mcdermott DO MD Orders:  PT Eval and Treat   Return MD Appt:  10/18/24   Date of Onset:  3 years ago  Allergies:   Aspirin and Sulfa antibiotics  Restrictions/Precautions:   None      Interventions Planned (Treatment may consist of any combination of the following):     See Assessment Note    Subjective Comments:  Stress incontinence primarily with transfers    Probably not drinking enough.  Has jock itch, using a cream.  Had some days when he was involved in an activity and didn't change pad often enough, went 4 hours between changes, believes this may have caused it.      Initial Pain Level:     0/10  Post Session Pain Level:       0/10  Medications Last Reviewed:  2024  Updated Objective Findings:        Observation:  No visible signs of redness or irritation in perineal area, surrounded scrotum, in skin folds at inguinal area, or into the

## 2024-11-08 ENCOUNTER — APPOINTMENT (OUTPATIENT)
Dept: PHYSICAL THERAPY | Age: 74
End: 2024-11-08
Payer: MEDICARE

## 2024-11-14 NOTE — PROGRESS NOTES
Reddy Singleton Jr.  : 1950  Primary: Medicare Part A And B (Medicare)  Secondary: ANTHEM MEDICARE SUPP [facility] @ [address]  [facility] [address]  Phone: [phone]  Fax: [phone] Plan Frequency: 1x/week  Plan of Care/Certification Expiration Date: 01/15/25        Plan of Care/Certification Expiration Date:  Plan of Care/Certification Expiration Date: 01/15/25    Frequency/Duration: Plan Frequency: 1x/week      Time In/Out:   Time In: 0903  Time Out: 1002      PT Visit Info:     Progress Note Due Date: 12/15/24  No Show: 0  Canceled Appointment: 0      Visit Count:  21    OUTPATIENT PHYSICAL THERAPY:   Treatment Note and Progress Report 11/15/2024       Episode  (PFPT)               Treatment Diagnosis:    Stress incontinence (female) (male)  Other lack of coordination  Other symptoms and signs involving the genitourinary system  Contributing Diagnosis:  Frequency of micturition (R35.0) and Pelvic Muscle Wasting (N81.84)  Medical/Referring Diagnosis:    Unspecified urinary incontinence [R32]    Referring Physician:  Po Mcdermott DO MD Orders:  PT Eval and Treat   Return MD Appt:  10/18/24   Date of Onset:  3 years ago  Allergies:   Aspirin and Sulfa antibiotics  Restrictions/Precautions:   None      Interventions Planned (Treatment may consist of any combination of the following):     See Assessment Note    Subjective Comments:  Stress incontinence primarily with transfers    Feels minimal amount of progress with the leaking, changing pads about 4 times per day (was previously 5).  Still struggling with timed toileting, loses track of timers.  Had difficulty doing bladder diary.    Two recent falls, no injuries.  During one fall on a hike, foot caught on root and fell backwards.  During the other fall, had been up reading for a while in middle of night, went to get up out of chair but was too weak, and fell to the side as the rocking chair feel

## 2024-11-15 ENCOUNTER — HOSPITAL ENCOUNTER (OUTPATIENT)
Dept: PHYSICAL THERAPY | Age: 74
Setting detail: RECURRING SERIES
Discharge: HOME | End: 2024-11-18
Payer: MEDICARE

## 2024-11-15 PROCEDURE — 97530 THERAPEUTIC ACTIVITIES: CPT

## 2024-11-15 PROCEDURE — 97110 THERAPEUTIC EXERCISES: CPT

## 2024-11-15 ASSESSMENT — PAIN SCALES - GENERAL: PAINLEVEL_OUTOF10: 0

## 2024-11-22 ENCOUNTER — HOSPITAL ENCOUNTER (OUTPATIENT)
Dept: PHYSICAL THERAPY | Age: 74
Setting detail: RECURRING SERIES
Discharge: HOME | End: 2024-11-25
Payer: MEDICARE

## 2024-11-22 PROCEDURE — 97112 NEUROMUSCULAR REEDUCATION: CPT

## 2024-11-22 PROCEDURE — 97530 THERAPEUTIC ACTIVITIES: CPT

## 2024-11-22 ASSESSMENT — PAIN SCALES - GENERAL: PAINLEVEL_OUTOF10: 0

## 2024-11-22 NOTE — PROGRESS NOTES
None today  Equipment provided today:   Handouts given today:   none.  Recommendations/Intent for next treatment session: Next visit will focus on:  Recheck biofeedback for defecation dynamics in 1-2 visits.  Water intake?  Frequency of urination?  Balance strategies with unstable surfaces.        >Total Treatment Billable Duration:  55 minutes   Time In: 0903  Time Out: 1003    Chiara Valle PT , DPT      Charge Capture  [facility]  Appt Desk     Future Appointments   Date Time Provider Department Center   12/6/2024  8:00 AM Chiara Valle PT [facility]   12/13/2024  8:00 AM Chiara Valle, PT [facility]   12/20/2024  9:00 AM Chiara Valle, PT [facility]   12/27/2024  9:00 AM Chiara Valle, PT [facility]   10/10/2025  9:30 AM PGU PREM BLOOD DRAWS [facility]   10/17/2025 10:30 AM Po Mcdermott DO [facility]

## 2024-12-06 ENCOUNTER — HOSPITAL ENCOUNTER (OUTPATIENT)
Dept: PHYSICAL THERAPY | Age: 74
Setting detail: RECURRING SERIES
Discharge: HOME | End: 2024-12-09
Payer: MEDICARE

## 2024-12-06 PROCEDURE — 97110 THERAPEUTIC EXERCISES: CPT

## 2024-12-06 PROCEDURE — 97530 THERAPEUTIC ACTIVITIES: CPT

## 2024-12-06 ASSESSMENT — PAIN SCALES - GENERAL: PAINLEVEL_OUTOF10: 0

## 2024-12-06 NOTE — PROGRESS NOTES
Reddy Singleton Jr.  : 1950  Primary: Medicare Part A [place] B (Medicare)  [place]  [facility] @ [address]  [facility] [address]  Phone: [phone]  Fax: [phone] Plan Frequency: 1x/week  Plan of Care/Certification Expiration Date: 01/15/25        Plan of Care/Certification Expiration Date:  Plan of Care/Certification Expiration Date: 01/15/25    Frequency/Duration: Plan Frequency: 1x/week      Time In/Out:   Time In: 0806  Time Out: 0903      PT Visit Info:     Progress Note Due Date: 12/15/24  No Show: 0  Canceled Appointment: 0      Visit Count:  23    OUTPATIENT PHYSICAL THERAPY:   Treatment Note 2024       Episode  (PFPT)               Treatment Diagnosis:    Stress incontinence (female) (male)  Other lack of coordination  Other symptoms and signs involving the genitourinary system  Contributing Diagnosis:  Frequency of micturition (R35.0) and Pelvic Muscle Wasting (N81.84)  Medical/Referring Diagnosis:    Unspecified urinary incontinence [R32]    Referring Physician:  Po Mcdermott DO MD Orders:  PT Eval and Treat   Return MD Appt:  10/18/24   Date of Onset:  3 years ago  Allergies:   Aspirin and Sulfa antibiotics  Restrictions/Precautions:   None      Interventions Planned (Treatment may consist of any combination of the following):     See Assessment Note    Subjective Comments:  Stress incontinence primarily with transfers    Has had a hard time keeping up with the bladder diary.  Wife states, \"I think the Paulette tree drinks more than he does.\"    Feels he is still moving around faster and easier to get up/down, friends have commented on how he looks like he is doing better and is more mobile.    Did have a fall backward last week when pulling a door shut.  Fell back into the door jam and broke it, but did not cause personal injury.    Initial Pain Level:     0/10  Post Session Pain Level:

## 2024-12-13 ENCOUNTER — APPOINTMENT (OUTPATIENT)
Dept: PHYSICAL THERAPY | Age: 74
End: 2024-12-13
Payer: MEDICARE

## 2024-12-20 ENCOUNTER — HOSPITAL ENCOUNTER (OUTPATIENT)
Dept: PHYSICAL THERAPY | Age: 74
Setting detail: RECURRING SERIES
Discharge: HOME | End: 2024-12-23
Payer: MEDICARE

## 2024-12-20 PROCEDURE — 97530 THERAPEUTIC ACTIVITIES: CPT

## 2024-12-20 PROCEDURE — 97112 NEUROMUSCULAR REEDUCATION: CPT

## 2024-12-20 ASSESSMENT — PAIN SCALES - GENERAL: PAINLEVEL_OUTOF10: 0

## 2024-12-20 NOTE — PROGRESS NOTES
Reddy Singleton Jr.  : 1950  Primary: Medicare Part A And B (Medicare)  Secondary:  [facility] @ [address]  [facility] [address]  Phone: [phone]  Fax: [phone] Plan Frequency: 1x/week  Plan of Care/Certification Expiration Date: 01/15/25        Plan of Care/Certification Expiration Date:  Plan of Care/Certification Expiration Date: 01/15/25    Frequency/Duration: Plan Frequency: 1x/week      Time In/Out:   Time In: 0902  Time Out: 1003      PT Visit Info:     Progress Note Due Date: 25  No Show: 0  Canceled Appointment: 0      Visit Count:  24    OUTPATIENT PHYSICAL THERAPY:   Treatment Note and Progress Report 2024       Episode  (PFPT)               Treatment Diagnosis:    Stress incontinence (female) (male)  Other lack of coordination  Other symptoms and signs involving the genitourinary system  Contributing Diagnosis:  Frequency of micturition (R35.0) and Pelvic Muscle Wasting (N81.84)  Medical/Referring Diagnosis:    Unspecified urinary incontinence [R32]    Referring Physician:  Po Mcdermott DO MD Orders:  PT Eval and Treat   Return MD Appt:  10/18/24   Date of Onset:  3 years ago  Allergies:   Aspirin and Sulfa antibiotics  Restrictions/Precautions:   None      Interventions Planned (Treatment may consist of any combination of the following):     See Assessment Note    Subjective Comments:  Stress incontinence primarily with transfers    Hasn't been doing Kegel exercises like he should.  Does, however, do some of the other exercises periodically throughout the day (open book, pelvic rocking, tail wags, lower trunk rotation).  Also going to pool, working out at the gym, and going for hikes weekly.  Still not drinking enough water.  Fills three water bottles in the morning and places them around the house.  Hasn't been timing voids like he wants to due to getting distracted, so his leakage overflows his pad.    Initial Pain

## 2024-12-27 ENCOUNTER — HOSPITAL ENCOUNTER (OUTPATIENT)
Dept: PHYSICAL THERAPY | Age: 74
Setting detail: RECURRING SERIES
Discharge: HOME | End: 2024-12-30
Payer: MEDICARE

## 2024-12-27 PROCEDURE — 97110 THERAPEUTIC EXERCISES: CPT

## 2024-12-27 ASSESSMENT — PAIN SCALES - GENERAL: PAINLEVEL_OUTOF10: 0

## 2024-12-27 NOTE — PROGRESS NOTES
12/27/2024  Updated Objective Findings:        Palpation:  Palpable contraction at ischiorectal fossa in sidelying with clam exercise.  Showed patient how to self-palpate for contraction.    Treatment     THERAPEUTIC ACTIVITY: (0 minutes):      Education done today per chart below in bold.    TA Educational Topic Notes Date Completed   Pathology/Anatomy/PFM Function Relationship between PFM and diaphragm, roles for pelvic stability;  Role of neurological system with continence, nocturnal enuresis 05/01/24 09/13/24   Bladder health education Increasing water intake, storage capacity  Increasing water intake on wakening, water intake goals, correlation of dehydration with constipation  Reviewed water intake, normal bladder storage capacity, normal frequency.  Encouraged increasing water intake. 05/01/24    07/12/24        10/17/24      11/15/24   Urinary urge suppression Stop, breathe, quick flicks, distract.  Normal urinary frequency, goals with timed toileting, setting alarms (not reminder, which only rings once). 08/23/24   The [facility] trials after leakage. 07/26/24   Voiding strategies Timed toileting on awakening and every two hours starting around 8 AM (wakes most often at 6:30), finishing with one before bedtime.  Setting timer for 2 hours on phone in lieu of reminder.  Shake downward prior to getting up from toilet to minimize leakage upon standing.  Setting timer for 1.5 hours and new alarm.    \"Dribble Stop\" clamp as consideration for helping minimize leakage, timer at 1.5 hours 06/28/24 07/19/24 08/23/24 09/06/24 09/13/24   Nocturia tips Stopping fluids 2 hours before bedtime, increasing fluid intake earlier in day to allow for tapering before bedtime. 11/15/24   Bowel/Bladder log Focusing on fluid intake and urination frequency  Reviewed bladder diary use, what to record, benefits of use, expectations with frequency and water intake.  Gave four bladder diary cards for ease of

## 2025-01-03 ENCOUNTER — APPOINTMENT (OUTPATIENT)
Dept: PHYSICAL THERAPY | Age: 75
End: 2025-01-03
Payer: MEDICARE

## 2025-01-09 NOTE — THERAPY DISCHARGE
Reddy Singleton Jr.  : 1950  Primary: Medicare Part A And B (Medicare)  Secondary:  [facility] @ [address]  [address]  [facility] [address]  Phone: [phone]  Fax: [phone] Plan Frequency: 1x/week    Plan of Care/Certification Expiration Date: 01/15/25        Plan of Care/Certification Expiration Date:  Plan of Care/Certification Expiration Date: 01/15/25    Frequency/Duration: Plan Frequency: 1x/week      Time In/Out:   Time In: 0903  Time Out: 1004    PT Visit Info:    Plan Frequency: 1x/week  Progress Note Due Date: 25  No Show: 0  Canceled Appointment: 0      Visit Count:  26                OUTPATIENT PHYSICAL THERAPY:             Discharge Summary 1/10/2025               Episode (PFPT)         Treatment Diagnosis:     Stress incontinence (female) (male)  Other lack of coordination  Other symptoms and signs involving the genitourinary system  Contributing Diagnosis:  Frequency of micturition (R35.0) and Pelvic Muscle Wasting (N81.84)  Medical/Referring Diagnosis:    Unspecified urinary incontinence [R32]    Referring Physician:  Po Mcdermott DO MD Orders:  PT Eval and Treat  Return MD Appt:  1018/24  Date of Onset:    3 years ago  Allergies:  Aspirin and Sulfa antibiotics  Restrictions/Precautions:    None      Medications Last Reviewed:  1/10/2025     SUBJECTIVE   History of Injury/Illness (Reason for Referral):  Mr. Singleton is 75 yo male referred to pelvic floor physical therapy (PFPT) 2/2 prostate cancer by Po Mcdermott DO.  He is accompanied throughout the evaluation by his wife, Violette.    \"Reddy\" had robotic-assisted laparoscopic prostatectomy in 2014.  He had a lower abdominal hernia repaired in spring 2015, and after that time, had pelvic floor physical therapy with Andressa Vieira to help with his symptoms of urinary incontinence and pelvic cramping during intercourse.  His incontinence improved to a level that he

## 2025-01-09 NOTE — PROGRESS NOTES
proper body alignment.  Progressed complexity of movement as indicated.      Date:  11/15/24 Date:  12/06/24 Date:  12/20/24 Date:  12/27/24 Date:  01/10/25   Activity/Exercise Parameters Parameters Parameters Parameters Parameters   Home Exercise Program (HEP) education  [employer] access code [email or web address]  [email or web address]  Reviewed HEP and answered all patient questions.  Advised trying to perform bridge with feet on ball or chair at home. Answered questions on bridge and clam exercises  Taught patient how to self-palpate for contraction in sidelying with clam. Reviewed HEP       NuStep L3 x6'    Pelvic mobility and coordination - Happy baby  - Figure 4       Lumbopelvic strengthening - Bridge with feet on ball - Clam 10s holds x5  - Bridge with feet on ball 10s holds x5  - Hip flexion to bar x20 B  - Hip abduction x30s B  - Hip extension x20 B  - Hamstring curls in standing x20 B  - Chair squats 2x10 - Chest press in lunge position with weight shifts L5/6 band x10 B  - Rowing with weight shifts L5/6 band x10 B  - Single arm lat pulls with narrow stance L5/6 band x10 B  - Lateral pulls with trunk rotation L5/6 band x10 B  - Lateral lifts L5/6 band x10 B  - Forward lifts L5/6 band x10 B  - Squat to low stool 2x5   Balance and PFM stability - Step-up and over on balance pad fwd x5'  - Two-foot step-up and over laterally with balance pad x5' - Lateral step over balance pad with emphasis on large movement  - Forward step over balance pad into lunge  - Backward step over balance pad into reverse lunge  - Narrow base stance on balance pad with dynamic perturbations  - Tandem stance on balance pad with dynamic perturbations  - Static balance on balance pad with perturbations, narrow stance and staggered stance each foot forward x2' each  - Lateral steps over claire x2' each direction.  - Ball catch/toss 6# x20  - Half kneeling lifts with 6# ball x15 B   - Large lateral steps over claire x2' each

## 2025-01-10 ENCOUNTER — HOSPITAL ENCOUNTER (OUTPATIENT)
Dept: PHYSICAL THERAPY | Age: 75
Setting detail: RECURRING SERIES
Discharge: HOME | End: 2025-01-13
Payer: MEDICARE

## 2025-01-10 PROCEDURE — 97110 THERAPEUTIC EXERCISES: CPT

## 2025-01-10 ASSESSMENT — PAIN SCALES - GENERAL: PAINLEVEL_OUTOF10: 0

## 2025-01-16 ENCOUNTER — HOSPITAL ENCOUNTER (OUTPATIENT)
Dept: PHYSICAL THERAPY | Age: 75
Setting detail: RECURRING SERIES
End: 2025-01-16
Payer: MEDICARE

## 2025-01-16 NOTE — THERAPY EVALUATION
Blakelyjulian Singleton   : 1950  Primary: Medicare Part A And B  Secondary:  [facility] @ [address]  [facility] [address]  Phone: [phone]  Fax: [phone]    Visit Info:    Effective Dates  2025 TO 2025   Frequency/Duration    1 time(s) a week for 60-90 days   SPEECH LANGUAGE PATHOLOGY: COMMUNICATION    Initial Assessment 2025     Appt Desk   Episode  Charges Attendance Report   Events        Treatment Diagnosis:    Dysphagia, oropharyngeal phase  Dysarthria and anarthria  Medical/Referring Diagnosis:   Dysarthria and anarthria  Unspecified speech disturbances  Referring Physician:  Beka Mabry Jr., MD MD Orders:  Eval and Treat   Return MD Appt:  3/4/25  Date of Onset:  1 year ago  Allergies:  Aspirin and Sulfa antibiotics  Medications Last Reviewed:  2025     SUBJECTIVE    History of Injury/Illness (Reason for Referral):  Pt with whipple's disease. Pt expresses concerns with slurring speech. His wife often asks him to repeat himself. He uses voice command on the phone a lot and it often does not understand what he has said. Spouse reports there are times where speech is more clear, most often when out with other people. Less clear at the end of the day.  Pt/spouse also report concern of coughing with liquids. Reports it usually happens on the 2nd swallow; occurs a couple times a day. Reports no solid or pill dysphagia. Onset about a year ago    Patient Stated Goal(s):  Ensure safety with swallow    Past Medical History/Comorbidities:   Mr. Singleton  has a past medical history of Cancer (HCC), Hypercholesteremia, Hypertension, Inguinal hernia, Neurological disorder, Personal history of prostate cancer, and Psychiatric disorder.  Mr. Singleton  has a past surgical history that includes Prostatectomy; hernia repair (Left); pr appendectomy; and biopsy prostate.  Current Communication Status:  Communication Observation: Dysarthric  Follows

## 2025-01-17 ENCOUNTER — HOSPITAL ENCOUNTER (OUTPATIENT)
Dept: PHYSICAL THERAPY | Age: 75
Setting detail: RECURRING SERIES
Discharge: HOME | End: 2025-01-20
Payer: MEDICARE

## 2025-01-17 DIAGNOSIS — R13.12 DYSPHAGIA, OROPHARYNGEAL PHASE: Primary | ICD-10-CM

## 2025-01-17 DIAGNOSIS — R47.1 DYSARTHRIA AND ANARTHRIA: ICD-10-CM

## 2025-01-17 PROCEDURE — 92523 SPEECH SOUND LANG COMPREHEN: CPT

## 2025-01-17 PROCEDURE — 92610 EVALUATE SWALLOWING FUNCTION: CPT

## 2025-01-29 NOTE — PROGRESS NOTES
Reddy Singleton Jr.  : 1950  Primary: Medicare Part A And B  Secondary:  [facility] @ [address]  [facility] [address]  Phone: [phone]  Fax: [phone] No data recorded  No data recorded    SLP VISIT INFO  Effective Date  2025 TO 2025   Frequency/Duration    1 time(s) a week for 60-90 days  SLP Visit Info:   Total # of Visits to Date: 1      OUTPATIENT SPEECH PATHOLOGY NOTE: Dysphagia and Communication  Daily Note 2025  Appt Desk   Episode  Charges Attendance Report   Events      Treatment Diagnosis:    Dysphagia, oropharyngeal phase  Dysarthria and anarthria  Medical/Referring Diagnosis:    Dysarthria and anarthria  Unspecified speech disturbances  Referring Physician:  Beka Mabry Jr., MD MD Orders:  Eval and Treat   Allergies:  Aspirin and Sulfa antibiotics  Medications Last Reviewed:  2025  Subjective Comments: Patient reports he and spouse have been pleased with how well he has done with swallowing since IE.   Pain:  Patient does not c/o pain.   Recommendations/Plan     RECOMMENDATIONS   Diet:  Regular Consistency  Thin Liquids    Medications: as tolerated   Compensatory Swallowing Strategies:  Alternate solids and liquids  Slow rate of intake  Effortful swallow  Remain upright for 30-45 minutes after meals  Small bites/sips  Upright as possible for all oral intake    Therapeutic Intervention:  Patient/family education  Treatment to improve/facilitate oral/pharyngeal skills  Training in use of compensatory safe swallowing strategies/feeding guidelines   Interventions Planned: (Treatment may consist of any combination of the following)  See Assessment Note  GOALS: (Goals have been discussed and agreed upon with patient.)  Short-Term Functional Goals: Time Frame: 8 weeks  Dysphagia  Patient will consume regular textures and thin liquids without overt signs or symptoms of airway compromise.  Patient will perform effortful

## 2025-01-31 ENCOUNTER — HOSPITAL ENCOUNTER (OUTPATIENT)
Dept: PHYSICAL THERAPY | Age: 75
Setting detail: RECURRING SERIES
End: 2025-01-31
Payer: MEDICARE

## 2025-01-31 PROCEDURE — 92526 ORAL FUNCTION THERAPY: CPT

## 2025-01-31 PROCEDURE — 92507 TX SP LANG VOICE COMM INDIV: CPT

## 2025-02-05 NOTE — PROGRESS NOTES
Reddy Singleton Jr.  : 1950  Primary: Medicare Part A And B  Secondary:  [facility] @ [address]  [facility] [address]  Phone: [phone]  Fax: [phone] No data recorded  No data recorded    SLP VISIT INFO  Effective Date  2025 TO 2025   Frequency/Duration    1 time(s) a week for 60-90 days  SLP Visit Info:   Total # of Visits to Date: 2      OUTPATIENT SPEECH PATHOLOGY NOTE: Dysphagia and Communication  Daily Note 2025  Appt Desk   Episode  Charges Attendance Report   Events      Treatment Diagnosis:    Dysphagia, oropharyngeal phase  Dysarthria and anarthria  Medical/Referring Diagnosis:    Dysarthria and anarthria  Unspecified speech disturbances  Referring Physician:  Beka Mabry Jr., MD MD Orders:  Eval and Treat   Allergies:  Aspirin and Sulfa antibiotics  Medications Last Reviewed:  2025  Subjective Comments: Patient reports \"my wife still can't hear me.\" States he didn't know he was supposed to be doing the effortful swallow as an exercise from previous session.   Pain:  Patient does not c/o pain.   Recommendations/Plan     RECOMMENDATIONS   Diet:  Regular Consistency  Thin Liquids    Medications: as tolerated   Compensatory Swallowing Strategies:  Alternate solids and liquids  Slow rate of intake  Effortful swallow  Remain upright for 30-45 minutes after meals  Small bites/sips  Upright as possible for all oral intake    Therapeutic Intervention:  Patient/family education  Treatment to improve/facilitate oral/pharyngeal skills  Training in use of compensatory safe swallowing strategies/feeding guidelines   Interventions Planned: (Treatment may consist of any combination of the following)  See Assessment Note  GOALS: (Goals have been discussed and agreed upon with patient.)  Short-Term Functional Goals: Time Frame: 8 weeks  Dysphagia  Patient will consume regular textures and thin liquids without overt signs or symptoms of

## 2025-02-06 ENCOUNTER — HOSPITAL ENCOUNTER (OUTPATIENT)
Dept: PHYSICAL THERAPY | Age: 75
Setting detail: RECURRING SERIES
Discharge: HOME | End: 2025-02-09
Payer: MEDICARE

## 2025-02-06 PROCEDURE — 92507 TX SP LANG VOICE COMM INDIV: CPT

## 2025-02-06 PROCEDURE — 92526 ORAL FUNCTION THERAPY: CPT

## 2025-02-13 ENCOUNTER — APPOINTMENT (OUTPATIENT)
Dept: PHYSICAL THERAPY | Age: 75
End: 2025-02-13
Payer: MEDICARE

## 2025-02-20 ENCOUNTER — HOSPITAL ENCOUNTER (OUTPATIENT)
Dept: PHYSICAL THERAPY | Age: 75
Setting detail: RECURRING SERIES
Discharge: HOME | End: 2025-02-23
Payer: MEDICARE

## 2025-02-20 PROCEDURE — 92507 TX SP LANG VOICE COMM INDIV: CPT

## 2025-02-20 PROCEDURE — 92526 ORAL FUNCTION THERAPY: CPT

## 2025-02-26 NOTE — PROGRESS NOTES
Reddy Singleton Jr.  : 1950  Primary: Medicare Part [place]  [place]  [facility] @ [address]  [place] [address]  Phone: [phone]  Fax: [phone] No data recorded  No data recorded    SLP VISIT INFO  Effective Date  2025 TO 2025   Frequency/Duration    1 time(s) a week for 60-90 days  SLP Visit Info:   Canceled Appointment: 1 ( out of town)  Total # of Visits to Date: 4      OUTPATIENT SPEECH PATHOLOGY NOTE: Dysphagia and Communication  Daily Note 2025  Appt Desk   Episode  Charges Attendance Report   Events      Treatment Diagnosis:    Dysphagia, oropharyngeal phase  Dysarthria and anarthria  Medical/Referring Diagnosis:    Dysarthria and anarthria  Unspecified speech disturbances  Referring Physician:  Beka Mabry Jr., MD MD Orders:  Eval and Treat   Allergies:  Aspirin and Sulfa antibiotics  Medications Last Reviewed:  2025  Subjective Comments: Patient reports dry throat today. States sometimes his mouth falls open while sleeping with his nasal CPAP mask and thinks this may have contributed.   Pain:  Patient does not c/o pain.   Recommendations/Plan     RECOMMENDATIONS   Diet:  Regular Consistency  Thin Liquids    Medications: as tolerated   Compensatory Swallowing Strategies:  Alternate solids and liquids  Slow rate of intake  Effortful swallow  Remain upright for 30-45 minutes after meals  Small bites/sips  Upright as possible for all oral intake    Therapeutic Intervention:  Patient/family education  Treatment to improve/facilitate oral/pharyngeal skills  Training in use of compensatory safe swallowing strategies/feeding guidelines   Interventions Planned: (Treatment may consist of any combination of the following)  See Assessment Note  GOALS: (Goals have been discussed and agreed upon with patient.)  Short-Term Functional Goals: Time Frame: 8 weeks  Dysphagia  Patient will consume

## 2025-02-27 ENCOUNTER — HOSPITAL ENCOUNTER (OUTPATIENT)
Dept: PHYSICAL THERAPY | Age: 75
Setting detail: RECURRING SERIES
End: 2025-02-27
Payer: MEDICARE

## 2025-02-27 PROCEDURE — 92507 TX SP LANG VOICE COMM INDIV: CPT

## 2025-03-06 ENCOUNTER — HOSPITAL ENCOUNTER (OUTPATIENT)
Dept: PHYSICAL THERAPY | Age: 75
Setting detail: RECURRING SERIES
Discharge: HOME | End: 2025-03-09
Payer: MEDICARE

## 2025-03-06 PROCEDURE — 92507 TX SP LANG VOICE COMM INDIV: CPT

## 2025-03-06 NOTE — PROGRESS NOTES
Reddy Singleton Jr.  : 1950  Primary: Medicare Part [place]  [place]  [facility] @ [address]  [place] [address]  Phone: [phone]  Fax: [phone] No data recorded  No data recorded    SLP VISIT INFO  Effective Date  2025 TO 2025   Frequency/Duration    1 time(s) a week for 60-90 days  SLP Visit Info:   Canceled Appointment: 1 ( out of town)  Total # of Visits to Date: 5      OUTPATIENT SPEECH PATHOLOGY NOTE: Dysphagia and Communication  Daily Note 3/6/2025  Appt Desk   Episode  Charges Attendance Report   Events      Treatment Diagnosis:    Dysphagia, oropharyngeal phase  Dysarthria and anarthria  Medical/Referring Diagnosis:    Dysarthria and anarthria  Unspecified speech disturbances  Referring Physician:  Beka Mabry Jr., MD MD Orders:  Eval and Treat   Allergies:  Aspirin and Sulfa antibiotics  Medications Last Reviewed:  3/6/2025  Subjective Comments: Patient reports feeling \"throaty\"  d/t allergens, pollen. States taking daily allergy medication, warm water feels best.   Pain:  Patient does not c/o pain.   Recommendations/Plan     RECOMMENDATIONS   Diet:  Regular Consistency  Thin Liquids    Medications: as tolerated   Compensatory Swallowing Strategies:  Alternate solids and liquids  Slow rate of intake  Effortful swallow  Remain upright for 30-45 minutes after meals  Small bites/sips  Upright as possible for all oral intake    Therapeutic Intervention:  Patient/family education  Treatment to improve/facilitate oral/pharyngeal skills  Training in use of compensatory safe swallowing strategies/feeding guidelines   Interventions Planned: (Treatment may consist of any combination of the following)  See Assessment Note  GOALS: (Goals have been discussed and agreed upon with patient.)  Short-Term Functional Goals: Time Frame: 8 weeks  Dysphagia  Patient will consume regular textures and thin liquids

## 2025-03-13 NOTE — PROGRESS NOTES
instance of strong cough with liquids in a long time)  Effortful swallow - patient completed 3 sets of 10 with thin liquids   Created a repetition tracking handout and provided to patient with goal to gradually increase past 30 repetitions (goal of 50)    Motor speech  Provided education on compensatory strategies of SLOB, slow, loud volume, over-articulation, pause and breathe, with verbal and written instruction  Patient able to recall strategies with min cues  Diaphragmatic breathing exercises: 2 min; appropriate diaphragmatic breathing patterns with modified independence   Automatic speech tasks with coordination of respiration and phonation (counting, DOTW, MOTY): --  Produced personally relevant words --  /l/ blend words: 90% accuracy, min cues; errors more prominent when involving an initial bilabial sound  /l/ blend sentences: 88% accuracy, min cues, self correction x1, cued correction x2  Functional Sentences with strategies in place: 86% accuracy, min cues for slowing rate; pt was able to maintain volume well throughout  Reading with strategies in place: 85% accuracy, self correction x2, cued correction x1  Structured conversation intelligibility (topic: recent events): 85% with self corrections x2, cued corrections x4    HEP: effortful swallow encouraged increase past 3 sets of 10 (eventual goal of 50); tracking sheet, new /l/ blend sentences  Pt reports he completed 30 repetitions on 6 days, completed /l/ blends words and creating sentences - reviewed these went well overall    Treatment/Session Summary:    Communication/Consultation:  None today  Recommendations/Intent for next treatment session: Next visit will focus on goals.    Total Duration:  Time In: 0900  Time Out: 0950  Minutes: 50    Naty Kramer, SLP

## 2025-03-14 ENCOUNTER — HOSPITAL ENCOUNTER (OUTPATIENT)
Dept: PHYSICAL THERAPY | Age: 75
Setting detail: RECURRING SERIES
Discharge: HOME | End: 2025-03-17
Payer: MEDICARE

## 2025-03-14 PROCEDURE — 92526 ORAL FUNCTION THERAPY: CPT

## 2025-03-14 PROCEDURE — 92507 TX SP LANG VOICE COMM INDIV: CPT

## 2025-03-19 NOTE — PROGRESS NOTES
Reddy Singleton Jr.  : 1950  Primary: Medicare [place]  [place]  [facility] @ [address]  [place] [address]  Phone: [phone]  Fax: [phone] No data recorded  No data recorded    SLP VISIT INFO  Effective Date  2025 TO 2025   Frequency/Duration    1 time(s) a week for 60-90 days  SLP Visit Info:   Canceled Appointment: 1 ( out of town)  Total # of Visits to Date: 7      OUTPATIENT SPEECH PATHOLOGY NOTE: Dysphagia and Communication  Daily Note 3/20/2025  Appt Desk   Episode  Charges Attendance Report   Events      Treatment Diagnosis:    Dysphagia, oropharyngeal phase  Dysarthria and anarthria  Medical/Referring Diagnosis:    Dysarthria and anarthria  Unspecified speech disturbances  Referring Physician:  Beka Mabry Jr., MD MD Orders:  Eval and Treat   Allergies:  Aspirin and Sulfa antibiotics  Medications Last Reviewed:  3/20/2025  Subjective Comments: Patient reports increased dry throat and allergy irritants.   Pain:  Patient does not c/o pain.   Recommendations/Plan     RECOMMENDATIONS   Diet:  Regular Consistency  Thin Liquids    Medications: as tolerated   Compensatory Swallowing Strategies:  Alternate solids and liquids  Slow rate of intake  Effortful swallow  Remain upright for 30-45 minutes after meals  Small bites/sips  Upright as possible for all oral intake    Therapeutic Intervention:  Patient/family education  Treatment to improve/facilitate oral/pharyngeal skills  Training in use of compensatory safe swallowing strategies/feeding guidelines   Interventions Planned: (Treatment may consist of any combination of the following)  See Assessment Note  GOALS: (Goals have been discussed and agreed upon with patient.)  Short-Term Functional Goals: Time Frame: 8 weeks  Dysphagia  Patient will consume regular textures and thin liquids without overt signs or symptoms of airway

## 2025-03-20 ENCOUNTER — HOSPITAL ENCOUNTER (OUTPATIENT)
Dept: PHYSICAL THERAPY | Age: 75
Setting detail: RECURRING SERIES
Discharge: HOME | End: 2025-03-23
Payer: MEDICARE

## 2025-03-20 PROCEDURE — 92507 TX SP LANG VOICE COMM INDIV: CPT

## 2025-03-21 NOTE — PROGRESS NOTES
Reddy Singleton Jr.  : 1950  Primary: Medicare Part A And B  Secondary:  [facility] @ [address]  [facility] [address]  Phone: [phone]  Fax: [phone] No data recorded  No data recorded    SLP VISIT INFO  Effective Date  2025 TO 2025   Frequency/Duration    1 time(s) a week for 60-90 days  SLP Visit Info:   Canceled Appointment: 1 ( out of town)  Total # of Visits to Date: 3      OUTPATIENT SPEECH PATHOLOGY NOTE: Dysphagia and Communication  Daily Note 2025  Appt Desk   Episode  Charges Attendance Report   Events      Treatment Diagnosis:    Dysphagia, oropharyngeal phase  Dysarthria and anarthria  Medical/Referring Diagnosis:    Dysarthria and anarthria  Unspecified speech disturbances  Referring Physician:  Beka Mabry Jr., MD MD Orders:  Eval and Treat   Allergies:  Aspirin and Sulfa antibiotics  Medications Last Reviewed:  2025  Subjective Comments: Patient reports swallow has been good over the last 2 weeks. States he completes exercises daily, though remains at 1 set of 10 most days   Pain:  Patient does not c/o pain.   Recommendations/Plan     RECOMMENDATIONS   Diet:  Regular Consistency  Thin Liquids    Medications: as tolerated   Compensatory Swallowing Strategies:  Alternate solids and liquids  Slow rate of intake  Effortful swallow  Remain upright for 30-45 minutes after meals  Small bites/sips  Upright as possible for all oral intake    Therapeutic Intervention:  Patient/family education  Treatment to improve/facilitate oral/pharyngeal skills  Training in use of compensatory safe swallowing strategies/feeding guidelines   Interventions Planned: (Treatment may consist of any combination of the following)  See Assessment Note  GOALS: (Goals have been discussed and agreed upon with patient.)  Short-Term Functional Goals: Time Frame: 8 weeks  Dysphagia  Patient will consume regular textures and thin liquids without  None

## 2025-03-28 ENCOUNTER — HOSPITAL ENCOUNTER (OUTPATIENT)
Dept: PHYSICAL THERAPY | Age: 75
Setting detail: RECURRING SERIES
Discharge: HOME | End: 2025-03-31
Payer: MEDICARE

## 2025-03-28 DIAGNOSIS — M62.81 MUSCLE WEAKNESS (GENERALIZED): ICD-10-CM

## 2025-03-28 DIAGNOSIS — R26.89 BALANCE PROBLEM: ICD-10-CM

## 2025-03-28 DIAGNOSIS — R26.89 OTHER ABNORMALITIES OF GAIT AND MOBILITY: Primary | ICD-10-CM

## 2025-03-28 DIAGNOSIS — R27.8 OTHER LACK OF COORDINATION: ICD-10-CM

## 2025-03-28 PROCEDURE — 97110 THERAPEUTIC EXERCISES: CPT

## 2025-03-28 PROCEDURE — 92507 TX SP LANG VOICE COMM INDIV: CPT

## 2025-03-28 PROCEDURE — 97163 PT EVAL HIGH COMPLEX 45 MIN: CPT

## 2025-03-28 ASSESSMENT — PAIN SCALES - GENERAL: PAINLEVEL_OUTOF10: 0

## 2025-03-28 NOTE — PROGRESS NOTES
Reddy Singleton Jr.  : 1950  Primary: Medicare Part [place] ([place]are)  [place]  [facility] @ [address]  [facility] [address]  Phone: [phone]  Fax: [phone] Plan Frequency: 1-2 times per week for 8-12 weeks    Plan of Care/Certification Expiration Date: 25        Plan of Care/Certification Expiration Date:  Plan of Care/Certification Expiration Date: 25    Frequency/Duration: Plan Frequency: 1-2 times per week for 8-12 weeks      Time In/Out:   Time In: 0950  Time Out: 1050      PT Visit Info:    Progress Note Due Date: 25  Total # of Visits to Date: 1  Progress Note Counter: 1      Visit Count:  1    OUTPATIENT PHYSICAL THERAPY:   Treatment Note 3/28/2025       Episode  (PT: balance and gait)               Treatment Diagnosis:    Other abnormalities of gait and mobility  Balance problem  Other lack of coordination  Muscle weakness (generalized)  Medical/Referring Diagnosis:    Unspecified abnormalities of gait and mobility  Dysarthria and anarthria  Other lack of coordination       Referring Physician:  Beka Mabry Jr., MD MD Orders:  PT Eval and Treat   Return MD Appt:  not known   Date of Onset:  Onset Date:  ()     Allergies:   Aspirin and Sulfa antibiotics  Restrictions/Precautions:   Fall Precautions:        Interventions Planned (Treatment may consist of any combination of the following):     See Assessment Note    Subjective Comments:   Need help with walking and balance, tend to fall backwards  Initial Pain Level:     0/10  Post Session Pain Level:      0/10  Medications Last Reviewed: 3/28/2025  Updated Objective Findings:  See Evaluation Note from today  Treatment   THERAPEUTIC EXERCISE: (20 minutes):    Exercises per grid below to improve mobility and strength.  Required minimal verbal cues to promote proper body alignment, promote proper body posture, and promote proper body mechanics.
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
## 2025-03-28 NOTE — THERAPY EVALUATION
Reddy Singleton Jr.  : 1950  Primary: Medicare Part [place] ([place])  [place]  [facility] @ [address]  [facility] [address]  Phone: [phone]  Fax: [phone] Plan Frequency: 1-2 times per week for 8-12 weeks    Plan of Care/Certification Expiration Date: 25        Plan of Care/Certification Expiration Date:  Plan of Care/Certification Expiration Date: 25    Frequency/Duration: Plan Frequency: 1-2 times per week for 8-12 weeks      Time In/Out:   Time In: 0950  Time Out: 1050      PT Visit Info:    Progress Note Due Date: 25  Total # of Visits to Date: 1  Progress Note Counter: 1      Visit Count:  1                OUTPATIENT PHYSICAL THERAPY:             Initial Assessment 3/28/2025               Episode (PT: balance and gait)         Treatment Diagnosis:     Other abnormalities of gait and mobility  Balance problem  Other lack of coordination  Muscle weakness (generalized)  Medical/Referring Diagnosis:    Unspecified abnormalities of gait and mobility  Dysarthria and anarthria  Other lack of coordination       Referring Physician:  Beka Mabry Jr., MD MD Orders:  PT Eval and Treat   Return MD Appt:  not known  Date of Onset:  Onset Date:  ()     Allergies:  Aspirin and Sulfa antibiotics  Restrictions/Precautions:    Fall Precautions:        Medications Last Reviewed: 3/28/2025     SUBJECTIVE   History of Injury/Illness (Reason for Referral):  Balance and coordination problems since summer of 2020  Then started falling 5-6 times per day  MRI found lesion in brain  Took off meds that may cause imbalance    Biopsy showed Whipple disease    Lost a lot of wt  Started infusions at [facility] and started PT    Saw dayana for pelvic floor; sees  speech with kendra    Tends to fall back when bends fwd; no falls since [place]    With obstacles, tends to wt shift back and falls back  Hikes and uses hiking

## 2025-04-10 NOTE — PROGRESS NOTES
Reddy Singleton Jr.  : 1950  [place]  [place]  [facility] @ [address]  [place] [address]  Phone: [phone]  Fax: [phone] No data recorded  No data recorded    SLP VISIT INFO  Effective Date  2025 TO 2025   Frequency/Duration    1 time(s) a week for 60-90 days  SLP Visit Info:   Canceled Appointment: 1 ( out of town)  Total # of Visits to Date: 9      OUTPATIENT SPEECH PATHOLOGY NOTE: Dysphagia and Communication  Daily Note 2025  Appt Desk   Episode  Charges Attendance Report   Events      Treatment Diagnosis:    Dysphagia, oropharyngeal phase  Dysarthria and anarthria  Medical/Referring Diagnosis:    Dysarthria and anarthria  Unspecified speech disturbances  Referring Physician:  Beka Mabry Jr., MD MD Orders:  Eval and Treat   Allergies:  Aspirin and Sulfa antibiotics  Medications Last Reviewed:  2025  Subjective Comments: Patient reports \"up and down\" the last 2 weeks  Pain:  Patient does not c/o pain.   Recommendations/Plan     RECOMMENDATIONS   Diet:  Regular Consistency  Thin Liquids    Medications: as tolerated   Compensatory Swallowing Strategies:  Alternate solids and liquids  Slow rate of intake  Effortful swallow  Remain upright for 30-45 minutes after meals  Small bites/sips  Upright as possible for all oral intake    Therapeutic Intervention:  Patient/family education  Treatment to improve/facilitate oral/pharyngeal skills  Training in use of compensatory safe swallowing strategies/feeding guidelines   Interventions Planned: (Treatment may consist of any combination of the following)  See Assessment Note  GOALS: (Goals have been discussed and agreed upon with patient.)  Short-Term Functional Goals: Time Frame: 8 weeks  Dysphagia  Patient will consume regular textures and thin liquids without overt signs or symptoms of airway compromise.  Patient will perform

## 2025-04-11 ENCOUNTER — HOSPITAL ENCOUNTER (OUTPATIENT)
Dept: PHYSICAL THERAPY | Age: 75
Setting detail: RECURRING SERIES
Discharge: HOME | End: 2025-04-14
Payer: MEDICARE

## 2025-04-11 PROCEDURE — 92507 TX SP LANG VOICE COMM INDIV: CPT

## 2025-04-11 PROCEDURE — 97112 NEUROMUSCULAR REEDUCATION: CPT

## 2025-04-11 ASSESSMENT — PAIN SCALES - GENERAL: PAINLEVEL_OUTOF10: 0

## 2025-04-11 NOTE — PROGRESS NOTES
Reddy Singleton Jr.  : 1950  Primary: Medicare Part [place] ([place]ic[place]  [place]  [facility] @ [address]  [facility] [address]  Phone: [phone]  Fax: [phone] Plan Frequency: 1-2 times per week for 8-12 weeks    Plan of Care/Certification Expiration Date: 25        Plan of Care/Certification Expiration Date:  Plan of Care/Certification Expiration Date: 25    Frequency/Duration: Plan Frequency: 1-2 times per week for 8-12 weeks      Time In/Out:   Time In: 0945  Time Out: 1030      PT Visit Info:    Progress Note Due Date: 25  Total # of Visits to Date: 2  Progress Note Counter: 2      Visit Count:  2    OUTPATIENT PHYSICAL THERAPY:   Treatment Note 2025       Episode  (PT: balance and gait)               Treatment Diagnosis:    Other abnormalities of gait and mobility  Balance problem  Other lack of coordination  Muscle weakness (generalized)  Medical/Referring Diagnosis:    Unspecified abnormalities of gait and mobility  Dysarthria and anarthria  Other lack of coordination       Referring Physician:  Beka Mabry Jr., MD MD Orders:  PT Eval and Treat   Return MD Appt:  not known   Date of Onset:  Onset Date:  ()     Allergies:   Aspirin and Sulfa antibiotics  Restrictions/Precautions:   Fall Precautions:        Interventions Planned (Treatment may consist of any combination of the following):     See Assessment Note    Subjective Comments:     a few falls, once standing up from toilet  Initial Pain Level:     0/10  Post Session Pain Level:      0/10  Medications Last Reviewed: 2025  Updated Objective Findings:  None Today  Treatment   THERAPEUTIC EXERCISE: (0 minutes):    Exercises per grid below to improve mobility and strength.  Required minimal verbal cues to promote proper body alignment, promote proper body posture, and promote proper body mechanics.  Progressed resistance, repetitions,

## 2025-04-17 ENCOUNTER — HOSPITAL ENCOUNTER (OUTPATIENT)
Dept: PHYSICAL THERAPY | Age: 75
Setting detail: RECURRING SERIES
Discharge: HOME | End: 2025-04-20
Payer: MEDICARE

## 2025-04-17 PROCEDURE — 92507 TX SP LANG VOICE COMM INDIV: CPT

## 2025-04-17 PROCEDURE — 97112 NEUROMUSCULAR REEDUCATION: CPT

## 2025-04-17 ASSESSMENT — PAIN SCALES - GENERAL: PAINLEVEL_OUTOF10: 0

## 2025-04-17 NOTE — THERAPY EVALUATION
17.67 seconds, within functional limits)  Average Adult  MPT range: 15-25 seconds  S/Z Ratio: s:13.89/z:14.07=0.99  (Previous: s:11.34/z:11.29=1.0, within functional limits)  Normal s/z ratio=1.0-1.2 (>1.4 indicates laryngeal dysfunction)  Resonance: Adequate  Prosody: Adequate  Articulation:  Rate of speech: Variable, instances of slow rate of speech, with bursts of rushed speech  Phrase length: Short  Rate of movement: 11 repetitions; Good (8-11 repetitions)  Sequential motion  Rate: Irregular  Precision:  articulatory imprecision    Conversational Intelligibility: 86% articulatory precision in a structured conversation  Clinical Observation(s):  blended word boundaries, articulatory imprecision    Differential Diagnosis: Slight Dysarthria     ASSESSMENT    Re-certification (4/17/25): Patient has attended 10 speech therapy sessions, following initial evaluation for treatment of dysphagia and dysarthria. Patient has made significant improvements with swallow function and reports minimal events of coughing with liquids. Patient continues with dysphagia exercises. Patient has progressed with dysarthria, though presents with decreased intelligibility at the conversational level, decreased ability to sustain volume, blended word boundaries, and articulatory imprecision. Patient would benefit from brief continuation of therapy in efforts to improve carryover of strategies and clarity of speech to the conversational level.   Initial Assessment (1/16/25):  Patient presents with mild dysarthria, characterized by slow rate of speech, imprecision with articulation, decreased ability to sustain volume, and decreased intelligibility with conversation. Patient reports he has to repeat himself often when communicating with his wife. Speech deficits have an impact on patient's ability to communicate with spouse, and with other individuals in environmental settings. Initiated compensatory speech strategies with patient (\"slob\" for

## 2025-04-17 NOTE — PROGRESS NOTES
Reddy Singleton Jr.  : 1950  [place]  [place]  [facility] @ [address]  [place] [address]  Phone: [phone]  Fax: [phone] No data recorded  No data recorded    SLP VISIT INFO  Effective Date  2025 TO 2025   Frequency/Duration    1 time(s) a week for 60-90 days  SLP Visit Info:   Canceled Appointment: 1 ( out of town)  Total # of Visits to Date: 10      OUTPATIENT SPEECH PATHOLOGY NOTE: Dysphagia and Communication  Daily Note 2025  Appt Desk   Episode  Charges Attendance Report   Events      Treatment Diagnosis:    Dysphagia, oropharyngeal phase  Dysarthria and anarthria  Medical/Referring Diagnosis:    Dysarthria and anarthria  Unspecified speech disturbances  Referring Physician:  Beka Mabry Jr., MD MD Orders:  Eval and Treat   Allergies:  Aspirin and Sulfa antibiotics  Medications Last Reviewed:  2025  Subjective Comments: Patient reports noticing decreased clarity of speech and states wife has had to have him report more frequently this week.   Pain:  Patient does not c/o pain.   Recommendations/Plan     RECOMMENDATIONS   Diet:  Regular Consistency  Thin Liquids    Medications: as tolerated   Compensatory Swallowing Strategies:  Alternate solids and liquids  Slow rate of intake  Effortful swallow  Remain upright for 30-45 minutes after meals  Small bites/sips  Upright as possible for all oral intake    Therapeutic Intervention:  Patient/family education  Treatment to improve/facilitate oral/pharyngeal skills  Training in use of compensatory safe swallowing strategies/feeding guidelines   Interventions Planned: (Treatment may consist of any combination of the following)  See Assessment Note  GOALS: (Goals have been discussed and agreed upon with patient.)  Short-Term Functional Goals: Time Frame: 8 weeks   Dysphagia  Patient will consume regular textures and thin  Male

## 2025-04-17 NOTE — PROGRESS NOTES
Reddy Singleton Jr.  : 1950  Primary: Medicare Part [place]  [place]  [facility] @ [address]  [facility] [address]  Phone: [phone]  Fax: [phone] Plan Frequency: 1-2 times per week for 8-12 weeks    Plan of Care/Certification Expiration Date: 25        Plan of Care/Certification Expiration Date:  Plan of Care/Certification Expiration Date: 25    Frequency/Duration: Plan Frequency: 1-2 times per week for 8-12 weeks      Time In/Out:   Time In: 0945  Time Out: 1030      PT Visit Info:    Progress Note Due Date: 25  Total # of Visits to Date: 3  Progress Note Counter: 3      Visit Count:  3    OUTPATIENT PHYSICAL THERAPY:   Treatment Note 2025       Episode  (PT: balance and gait)               Treatment Diagnosis:    Other abnormalities of gait and mobility  Balance problem  Other lack of coordination  Muscle weakness (generalized)  Medical/Referring Diagnosis:    Unspecified abnormalities of gait and mobility  Dysarthria and anarthria  Other lack of coordination       Referring Physician:  Beka Mabry Jr., MD MD Orders:  PT Eval and Treat   Return MD Appt:  not known   Date of Onset:  Onset Date:  ()     Allergies:   Aspirin and Sulfa antibiotics  Restrictions/Precautions:   Fall Precautions:        Interventions Planned (Treatment may consist of any combination of the following):     See Assessment Note    Subjective Comments:    \"It's been a wobbly week\"  Initial Pain Level:     0/10  Post Session Pain Level:      0/10  Medications Last Reviewed: 2025  Updated Objective Findings:  None Today  Treatment   THERAPEUTIC EXERCISE: (0 minutes):    Exercises per grid below to improve mobility and strength.  Required minimal verbal cues to promote proper body alignment, promote proper body posture, and promote proper body mechanics.  Progressed resistance, repetitions, and complexity of

## 2025-04-25 ENCOUNTER — HOSPITAL ENCOUNTER (OUTPATIENT)
Dept: PHYSICAL THERAPY | Age: 75
Setting detail: RECURRING SERIES
Discharge: HOME | End: 2025-04-28
Payer: MEDICARE

## 2025-04-25 PROCEDURE — 92507 TX SP LANG VOICE COMM INDIV: CPT

## 2025-04-25 PROCEDURE — 97112 NEUROMUSCULAR REEDUCATION: CPT

## 2025-04-25 ASSESSMENT — PAIN SCALES - GENERAL: PAINLEVEL_OUTOF10: 0

## 2025-04-25 NOTE — PROGRESS NOTES
Reddy Singleton Jr.  : 1950  Primary: Medicare Part [place]  [place]  [facility] @ [address]  [facility] [address]  Phone: [phone]  Fax: [phone] Plan Frequency: 1-2 times per week for 8-12 weeks    Plan of Care/Certification Expiration Date: 25        Plan of Care/Certification Expiration Date:  Plan of Care/Certification Expiration Date: 25    Frequency/Duration: Plan Frequency: 1-2 times per week for 8-12 weeks      Time In/Out:   Time In: 0945  Time Out: 1030      PT Visit Info:    Progress Note Due Date: 25  Total # of Visits to Date: 4  Progress Note Counter: 4      Visit Count:  4    OUTPATIENT PHYSICAL THERAPY:   Treatment Note 2025       Episode  (PT: balance and gait)               Treatment Diagnosis:    Other abnormalities of gait and mobility  Balance problem  Other lack of coordination  Muscle weakness (generalized)  Medical/Referring Diagnosis:    Unspecified abnormalities of gait and mobility  Dysarthria and anarthria  Other lack of coordination       Referring Physician:  Beka Mabry Jr., MD MD Orders:  PT Eval and Treat   Return MD Appt:  not known   Date of Onset:  Onset Date:  ()     Allergies:   Aspirin and Sulfa antibiotics  Restrictions/Precautions:   Fall Precautions:        Interventions Planned (Treatment may consist of any combination of the following):     See Assessment Note    Subjective Comments:   Doing well. Not as much exercise yesterday bc went on a boat tour at [facility].   Initial Pain Level:     0/10  Post Session Pain Level:      0/10  Medications Last Reviewed: 2025  Updated Objective Findings:  None Today  Treatment       NEUROMUSCULAR RE-EDUCATION: (45 minutes):    Exercise/activities per grid below to improve balance, coordination, kinesthetic sense, posture, and proprioception.  Required minimal verbal cues to promote static and

## 2025-04-25 NOTE — PROGRESS NOTES
Reddy Singleton Jr.  : 1950  Primary: Medicare [place]  [place]  [facility] @ [address]  [place] [address]  Phone: [phone]  Fax: [phone] No data recorded  No data recorded    SLP VISIT INFO  Effective Date  2025 to 2025   Frequency/Duration    1 time(s) a week for 60-90 days  SLP Visit Info:   Canceled Appointment: 1 ( out of town)  Total # of Visits to Date: 11      OUTPATIENT SPEECH PATHOLOGY NOTE: Dysphagia and Communication  Daily Note 2025  Appt Desk   Episode  Charges Attendance Report   Events      Treatment Diagnosis:    Dysphagia, oropharyngeal phase  Dysarthria and anarthria  Medical/Referring Diagnosis:    Dysarthria and anarthria  Unspecified speech disturbances  Referring Physician:  Beka Mabry Jr., MD MD Orders:  Eval and Treat   Allergies:  Aspirin and Sulfa antibiotics  Medications Last Reviewed:  2025  Subjective Comments: Patient reports no pertinent updates.   Pain:  Patient does not c/o pain.   Recommendations/Plan     RECOMMENDATIONS   Diet:  Regular Consistency  Thin Liquids    Medications: as tolerated   Compensatory Swallowing Strategies:  Alternate solids and liquids  Slow rate of intake  Effortful swallow  Remain upright for 30-45 minutes after meals  Small bites/sips  Upright as possible for all oral intake    Therapeutic Intervention:  Patient/family education  Treatment to improve/facilitate oral/pharyngeal skills  Training in use of compensatory safe swallowing strategies/feeding guidelines   Interventions Planned: (Treatment may consist of any combination of the following)  See Assessment Note  GOALS: (Goals have been discussed and agreed upon with patient.)  Short-Term Functional Goals: Time Frame: 8 weeks   Dysphagia  Patient will consume regular textures and thin liquids without overt signs or symptoms of airway compromise. (Goal met)  Patient will

## 2025-05-01 NOTE — PROGRESS NOTES
Reddy Singleton Jr.  : 1950  Primary: [place]  [place]  [facility] @ [address]  [place] [address]  Phone: [phone]  Fax: [phone] No data recorded  No data recorded    SLP VISIT INFO  Effective Date  2025 to 2025   Frequency/Duration    1 time(s) a week for 60-90 days  SLP Visit Info:   Canceled Appointment: 1 ( out of town)  Total # of Visits to Date: 12      OUTPATIENT SPEECH PATHOLOGY NOTE: Dysphagia and Communication  Daily Note 2025  Appt Desk   Episode  Charges Attendance Report   Events      Treatment Diagnosis:    Dysphagia, oropharyngeal phase  Dysarthria and anarthria  Medical/Referring Diagnosis:    Dysarthria and anarthria  Unspecified speech disturbances  Referring Physician:  Beka Mabry Jr., MD MD Orders:  Eval and Treat   Allergies:  Aspirin and Sulfa antibiotics  Medications Last Reviewed:  2025  Subjective Comments: Patient reports no pertinent updates.   Pain:  Patient does not c/o pain.   Recommendations/Plan     RECOMMENDATIONS   Diet:  Regular Consistency  Thin Liquids    Medications: as tolerated   Compensatory Swallowing Strategies:  Alternate solids and liquids  Slow rate of intake  Effortful swallow  Remain upright for 30-45 minutes after meals  Small bites/sips  Upright as possible for all oral intake    Therapeutic Intervention:  Patient/family education  Treatment to improve/facilitate oral/pharyngeal skills  Training in use of compensatory safe swallowing strategies/feeding guidelines   Interventions Planned: (Treatment may consist of any combination of the following)  See Assessment Note  GOALS: (Goals have been discussed and agreed upon with patient.)  Short-Term Functional Goals: Time Frame: 8 weeks   Dysphagia  Patient will consume regular textures and thin liquids without overt signs or symptoms of airway compromise. (Goal met)  Patient will

## 2025-05-02 ENCOUNTER — HOSPITAL ENCOUNTER (OUTPATIENT)
Dept: PHYSICAL THERAPY | Age: 75
Setting detail: RECURRING SERIES
Discharge: HOME | End: 2025-05-05
Payer: MEDICARE

## 2025-05-02 PROCEDURE — 92507 TX SP LANG VOICE COMM INDIV: CPT

## 2025-05-09 ENCOUNTER — HOSPITAL ENCOUNTER (OUTPATIENT)
Dept: PHYSICAL THERAPY | Age: 75
Setting detail: RECURRING SERIES
Discharge: HOME | End: 2025-05-12
Payer: MEDICARE

## 2025-05-09 PROCEDURE — 97112 NEUROMUSCULAR REEDUCATION: CPT

## 2025-05-09 PROCEDURE — 92507 TX SP LANG VOICE COMM INDIV: CPT

## 2025-05-09 ASSESSMENT — PAIN SCALES - GENERAL: PAINLEVEL_OUTOF10: 0

## 2025-05-09 NOTE — PROGRESS NOTES
Reddy Singleton Jr.  : 1950  Primary: [place]  [place]  [facility] @ [address]  [place] [address]  Phone: [phone]  Fax: [phone] No data recorded  No data recorded    SLP VISIT INFO  Effective Date  2025 to 2025   Frequency/Duration    1 time(s) a week for 60-90 days  SLP Visit Info:   Canceled Appointment: 1 ( out of town)  Total # of Visits to Date: 13      OUTPATIENT SPEECH PATHOLOGY NOTE: Dysphagia and Communication  Daily Note 2025  Appt Desk   Episode  Charges Attendance Report   Events      Treatment Diagnosis:    Dysphagia, oropharyngeal phase  Dysarthria and anarthria  Medical/Referring Diagnosis:    Dysarthria and anarthria  Unspecified speech disturbances  Referring Physician:  Beka Mabry Jr., MD MD Orders:  Eval and Treat   Allergies:  Aspirin and Sulfa antibiotics  Medications Last Reviewed:  2025  Subjective Comments: Patient reports no pertinent updates.   Pain:  Patient does not c/o pain.   Recommendations/Plan     RECOMMENDATIONS   Diet:  Regular Consistency  Thin Liquids    Medications: as tolerated   Compensatory Swallowing Strategies:  Alternate solids and liquids  Slow rate of intake  Effortful swallow  Remain upright for 30-45 minutes after meals  Small bites/sips  Upright as possible for all oral intake    Therapeutic Intervention:  Patient/family education  Treatment to improve/facilitate oral/pharyngeal skills  Training in use of compensatory safe swallowing strategies/feeding guidelines   Interventions Planned: (Treatment may consist of any combination of the following)  See Assessment Note  GOALS: (Goals have been discussed and agreed upon with patient.)  Short-Term Functional Goals: Time Frame: 8 weeks   Dysphagia  Patient will consume regular textures and thin liquids without overt signs or symptoms of airway compromise. (Goal met)  Patient will

## 2025-05-09 NOTE — PROGRESS NOTES
Reddy Singleton Jr.  : 1950  Primary: Medicare Part [place] (Medicare)  [place]  [facility] @ [address]  [facility] [address]  Phone: [phone]  Fax: [phone] Plan Frequency: 1-2 times per week for 8-12 weeks    Plan of Care/Certification Expiration Date: 25        Plan of Care/Certification Expiration Date:  Plan of Care/Certification Expiration Date: 25    Frequency/Duration: Plan Frequency: 1-2 times per week for 8-12 weeks      Time In/Out:   Time In: 859  Time Out: 945      PT Visit Info:    Progress Note Due Date: 25  Total # of Visits to Date: 5  Progress Note Counter: 1      Visit Count:  5    OUTPATIENT PHYSICAL THERAPY:   Treatment Note 2025       Episode  (PT: balance and gait)               Treatment Diagnosis:    Other abnormalities of gait and mobility  Balance problem  Other lack of coordination  Muscle weakness (generalized)  Medical/Referring Diagnosis:    Unspecified abnormalities of gait and mobility  Dysarthria and anarthria  Other lack of coordination       Referring Physician:  Beka Mabry Jr., MD MD Orders:  PT Eval and Treat   Return MD Appt:  not known   Date of Onset:  Onset Date:  ()     Allergies:   Aspirin and Sulfa antibiotics  Restrictions/Precautions:   Fall Precautions:        Interventions Planned (Treatment may consist of any combination of the following):     See Assessment Note    Subjective Comments:   I feel pretty good. No trips, no falls.    Initial Pain Level:     0/10  Post Session Pain Level:      0/10  Medications Last Reviewed: 2025  Updated Objective Findings:  None Today  Treatment       NEUROMUSCULAR RE-EDUCATION: (45 minutes):    Exercise/activities per grid below to improve balance, coordination, kinesthetic sense, posture, and proprioception.  Required minimal verbal cues to promote static and dynamic balance in standing.  Activity   
poles.    Does well on flat surfaces.     Not driving.    Trouble with buttons      Patient Stated Goal(s):  \"To walk and balance better\"  Initial Pain Level:      0/10   Post Session Pain Level:     0/10  Past Medical History/Comorbidities:   Mr. Singleton  has a past medical history of Cancer (HCC), Hypercholesteremia, Hypertension, Inguinal hernia, Neurological disorder, Personal history of prostate cancer, and Psychiatric disorder.  Mr. Singleton  has a past surgical history that includes Prostatectomy; hernia repair (Left); pr appendectomy; and biopsy prostate.  Social History/Living Environment:   Patient lives with their spouse  Type of Home: House: Number of stairs to enter/exit: one and split level home  Level of Assistance: Rehab: Modified independent    Prior Level of Function/Work/Activity:   Current Level of Function: Mod Independent     No AD; uses hiking poles when hiking  Learning:   Does the patient/guardian have any barriers to learning?: No barriers  Will there be a co-learner?: Yes  Co-learner name (if applicable): Violette Betancur  Does the co-learner have any barriers to learning?: No barriers  What is the preferred language of the patient/guardian?: English  What is the preferred language of the co-learner?: English  Is an  required?: No  How does the patient/guardian prefer to learn new concepts?: Listening; Reading; Demonstration; Pictures/Videos  How does the co-learner prefer to learn new concepts?: Listening; Reading; Demonstration; Pictures/Videos    Fall Risk Scale:   Bernardo Total Score: 25    Personal Factors:        Sex:  male        Age:  75 y.o.     OBJECTIVE   Observations:     Wt bearing through heels in standing/walking; stiff gait/mobility--decreased use of hip strategies; moves quickly, cuing to slow down with transitions     Current Level of Function:   Mod I  Functional Mobility:    independent  Sensation:   intact  Bed Mobility:   independent  Transfers:   Independent; no UE

## 2025-05-15 ENCOUNTER — HOSPITAL ENCOUNTER (OUTPATIENT)
Dept: PHYSICAL THERAPY | Age: 75
Setting detail: RECURRING SERIES
Discharge: HOME | End: 2025-05-18
Payer: MEDICARE

## 2025-05-15 PROCEDURE — 92507 TX SP LANG VOICE COMM INDIV: CPT

## 2025-05-15 PROCEDURE — 97112 NEUROMUSCULAR REEDUCATION: CPT

## 2025-05-15 ASSESSMENT — PAIN SCALES - GENERAL: PAINLEVEL_OUTOF10: 0

## 2025-05-15 NOTE — THERAPY DISCHARGE
Blakely ALANNA Singleton Jr.  : 1950  Primary: Medicare Part [place]  [place] [facility] @ [address]  [facility] [address]  Phone: [phone]  Fax: [phone]    Visit Info:    Effective Dates  2025 to 2025   Frequency/Duration    1 time(s) a week for 30-60 days   SPEECH LANGUAGE PATHOLOGY: COMMUNICATION    Discharge 5/15/2025     Appt Desk   Episode  Charges Attendance Report   Events        Treatment Diagnosis:    Dysphagia, oropharyngeal phase  Dysarthria and anarthria  Medical/Referring Diagnosis:   Dysarthria and anarthria  Unspecified speech disturbances  Referring Physician:  Beka Mabry Jr., MD MD Orders:  Eval and Treat   Return MD Appt:  3/4/25  Date of Onset:  1 year ago  Allergies:  Aspirin and Sulfa antibiotics  Medications Last Reviewed:  5/15/2025     SUBJECTIVE    History of Injury/Illness (Reason for Referral):  Pt with whipple's disease. Pt expresses concerns with slurring speech. His wife often asks him to repeat himself. He uses voice command on the phone a lot and it often does not understand what he has said. Spouse reports there are times where speech is more clear, most often when out with other people. Less clear at the end of the day.  Pt/spouse also report concern of coughing with liquids. Reports it usually happens on the 2nd swallow; occurs a couple times a day. Reports no solid or pill dysphagia. Onset about a year ago    Patient Stated Goal(s):  Ensure safety with swallow    Past Medical History/Comorbidities:   Mr. Singleton  has a past medical history of Cancer (HCC), Hypercholesteremia, Hypertension, Inguinal hernia, Neurological disorder, Personal history of prostate cancer, and Psychiatric disorder.  Mr. Singleton  has a past surgical history that includes Prostatectomy; hernia repair (Left); pr appendectomy; and biopsy prostate.  Current Communication Status:  Communication Observation:

## 2025-05-15 NOTE — THERAPY DISCHARGE
poles.    Does well on flat surfaces.     Not driving.    Trouble with buttons      Patient Stated Goal(s):  \"To walk and balance better\"  Initial Pain Level:      0/10   Post Session Pain Level:     0/10  Past Medical History/Comorbidities:   Mr. Singleton  has a past medical history of Cancer (HCC), Hypercholesteremia, Hypertension, Inguinal hernia, Neurological disorder, Personal history of prostate cancer, and Psychiatric disorder.  Mr. Singleton  has a past surgical history that includes Prostatectomy; hernia repair (Left); pr appendectomy; and biopsy prostate.  Social History/Living Environment:   Patient lives with their spouse  Type of Home: House: Number of stairs to enter/exit: one and split level home  Level of Assistance: Rehab: Modified independent    Prior Level of Function/Work/Activity:   Current Level of Function: Mod Independent     No AD; uses hiking poles when hiking  Learning:   Does the patient/guardian have any barriers to learning?: No barriers  Will there be a co-learner?: Yes  Co-learner name (if applicable): Violette Betancur  Does the co-learner have any barriers to learning?: No barriers  What is the preferred language of the patient/guardian?: English  What is the preferred language of the co-learner?: English  Is an  required?: No  How does the patient/guardian prefer to learn new concepts?: Listening; Reading; Demonstration; Pictures/Videos  How does the co-learner prefer to learn new concepts?: Listening; Reading; Demonstration; Pictures/Videos    Fall Risk Scale:   Bernardo Total Score: 25    Personal Factors:        Sex:  male        Age:  75 y.o.     OBJECTIVE   Observations:     Wt bearing through heels in standing/walking; stiff gait/mobility--decreased use of hip strategies; moves quickly, cuing to slow down with transitions     Current Level of Function:   Mod I  Functional Mobility:    independent  Sensation:   intact  Bed Mobility:   independent  Transfers:   Independent; no UE

## 2025-05-15 NOTE — PROGRESS NOTES
Reddy Singleton Jr.  : 1950  Primary: Medicare Part A [place] (Medicare)  [place]  [facility] @ [address]  [facility] [address]  Phone: [phone]  Fax: [phone] Plan Frequency: 1-2 times per week for 8-12 weeks    Plan of Care/Certification Expiration Date: 25        Plan of Care/Certification Expiration Date:  Plan of Care/Certification Expiration Date: 25    Frequency/Duration: Plan Frequency: 1-2 times per week for 8-12 weeks      Time In/Out:   Time In: 45  Time Out: 1035      PT Visit Info:    Progress Note Due Date: 25  Total # of Visits to Date: 6  Progress Note Counter: 2      Visit Count:  6    OUTPATIENT PHYSICAL THERAPY:   Treatment Note 5/15/2025       Episode  (PT: balance and gait)               Treatment Diagnosis:    Other abnormalities of gait and mobility  Balance problem  Other lack of coordination  Muscle weakness (generalized)  Medical/Referring Diagnosis:    Unspecified abnormalities of gait and mobility  Dysarthria and anarthria  Other lack of coordination       Referring Physician:  Beka Mabry Jr., MD MD Orders:  PT Eval and Treat   Return MD Appt:  not known   Date of Onset:  Onset Date:  ()     Allergies:   Aspirin and Sulfa antibiotics  Restrictions/Precautions:   Fall Precautions:        Interventions Planned (Treatment may consist of any combination of the following):     See Assessment Note    Subjective Comments:   Feel a little wobbly this morning. Doing HEP.  Initial Pain Level:     0/10  Post Session Pain Level:      0/10  Medications Last Reviewed: 5/15/2025  Updated Objective Findings:  None Today  Treatment       NEUROMUSCULAR RE-EDUCATION: (45 minutes):    Exercise/activities per grid below to improve balance, coordination, kinesthetic sense, posture, and proprioception.  Required minimal verbal cues to promote static and dynamic balance in standing.  Activity

## 2025-05-15 NOTE — PROGRESS NOTES
Reddy Singleton Jr.  : 1950  Primary: [place]  [place]  [facility] @ [address]  [place] [address]  Phone: [phone]  Fax: [phone] No data recorded  No data recorded    SLP VISIT INFO  Effective Date  2025 to 2025   Frequency/Duration    1 time(s) a week for 60-90 days  SLP Visit Info:   Canceled Appointment: 1 ( out of town)  Total # of Visits to Date: 14      OUTPATIENT SPEECH PATHOLOGY NOTE: Dysphagia and Communication  Daily Note 5/15/2025  Appt Desk   Episode  Charges Attendance Report   Events      Treatment Diagnosis:    Dysphagia, oropharyngeal phase  Dysarthria and anarthria  Medical/Referring Diagnosis:    Dysarthria and anarthria  Unspecified speech disturbances  Referring Physician:  Beka Mabry Jr., MD MD Orders:  Eval and Treat   Allergies:  Aspirin and Sulfa antibiotics  Medications Last Reviewed:  5/15/2025  Subjective Comments: Patient reports no pertinent updates.   Pain:  Patient does not c/o pain.   Recommendations/Plan     RECOMMENDATIONS   Diet:  Regular Consistency  Thin Liquids    Medications: as tolerated   Compensatory Swallowing Strategies:  Alternate solids and liquids  Slow rate of intake  Effortful swallow  Remain upright for 30-45 minutes after meals  Small bites/sips  Upright as possible for all oral intake    Therapeutic Intervention:  Patient/family education  Treatment to improve/facilitate oral/pharyngeal skills  Training in use of compensatory safe swallowing strategies/feeding guidelines   Interventions Planned: (Treatment may consist of any combination of the following)  See Assessment Note  GOALS: (Goals have been discussed and agreed upon with patient.)  Short-Term Functional Goals: Time Frame: 8 weeks   Dysphagia  Patient will consume regular textures and thin liquids without overt signs or symptoms of airway compromise. (Goal met)  Patient will